# Patient Record
Sex: FEMALE | Race: WHITE | NOT HISPANIC OR LATINO | Employment: OTHER | URBAN - METROPOLITAN AREA
[De-identification: names, ages, dates, MRNs, and addresses within clinical notes are randomized per-mention and may not be internally consistent; named-entity substitution may affect disease eponyms.]

---

## 2017-03-27 ENCOUNTER — OFFICE VISIT (OUTPATIENT)
Dept: ORTHOPEDICS | Facility: CLINIC | Age: 62
End: 2017-03-27
Payer: COMMERCIAL

## 2017-03-27 ENCOUNTER — RADIANT APPOINTMENT (OUTPATIENT)
Dept: GENERAL RADIOLOGY | Facility: CLINIC | Age: 62
End: 2017-03-27
Attending: ORTHOPAEDIC SURGERY
Payer: COMMERCIAL

## 2017-03-27 VITALS — HEIGHT: 65 IN | BODY MASS INDEX: 48.82 KG/M2 | RESPIRATION RATE: 18 BRPM | WEIGHT: 293 LBS

## 2017-03-27 DIAGNOSIS — Z96.651 HISTORY OF TOTAL KNEE ARTHROPLASTY, RIGHT: ICD-10-CM

## 2017-03-27 DIAGNOSIS — M25.561 RIGHT KNEE PAIN, UNSPECIFIED CHRONICITY: Primary | ICD-10-CM

## 2017-03-27 DIAGNOSIS — M25.561 RIGHT KNEE PAIN, UNSPECIFIED CHRONICITY: ICD-10-CM

## 2017-03-27 PROCEDURE — 73562 X-RAY EXAM OF KNEE 3: CPT | Mod: RT

## 2017-03-27 PROCEDURE — 99213 OFFICE O/P EST LOW 20 MIN: CPT | Performed by: ORTHOPAEDIC SURGERY

## 2017-03-27 NOTE — MR AVS SNAPSHOT
"              After Visit Summary   3/27/2017    Violeta Maria    MRN: [id number]           Patient Information     Date Of Birth          1955        Visit Information        Provider Department      3/27/2017 1:15 PM Abdon Barahona MD [facility]        Today's Diagnoses     Right knee pain, unspecified chronicity    -  1      Care Instructions    Clicking her is normal.  Continue strengthening.  Plan Return to clinic  July.        Follow-ups after your visit        Who to contact     If you have questions or need follow up information about today's clinic visit or your schedule please contact [facility] directly at [phone].  Normal or non-critical lab and imaging results will be communicated to you by MyChart, letter or phone within 4 business days after the clinic has received the results. If you do not hear from us within 7 days, please contact the clinic through [employer] or phone. If you have a critical or abnormal lab result, we will notify you by phone as soon as possible.  Submit refill requests through [employer] or call your pharmacy and they will forward the refill request to us. Please allow 3 business days for your refill to be completed.          Additional Information About Your Visit        MyChart Information     [employer] gives you secure access to your electronic health record. If you see a primary care provider, you can also send messages to your care team and make appointments. If you have questions, please call your primary care clinic.  If you do not have a primary care provider, please call [phone] and they will assist you.        Care EveryWhere ID     This is your Care EveryWhere ID. This could be used by other organizations to access your [place] medical records  [id number]        Your Vitals Were     Respirations Height BMI (Body Mass Index)             18 1.651 m (5' 5\") 48.76 kg/m2          Blood Pressure from Last 3 Encounters:   No " data found for BP    Weight from Last 3 Encounters:   03/27/17 132.9 kg (293 lb)   10/01/15 132.9 kg (293 lb)   08/27/15 132 kg (291 lb)               Primary Care Provider Office Phone # Fax #    Abdon Eitan Barahona [phone]       [facility] [address]  [facility] [address]        Thank you!     Thank you for choosing [facility]""  for your care. Our goal is always to provide you with excellent care. Hearing back from our patients is one way we can continue to improve our services. Please take a few minutes to complete the written survey that you may receive in the mail after your visit with us. Thank you!             Your Updated Medication List - Protect others around you: Learn how to safely use, store and throw away your medicines at www.disposemymeds.org.          This list is accurate as of: 3/27/17  1:46 PM.  Always use your most recent med list.                   Brand Name Dispense Instructions for use    ALLEGRA PO      Take  by mouth.       CYMBALTA 20 MG EC capsule   Generic drug:  DULoxetine      Take 20 mg by mouth 2 times daily.       FISH OIL          FLEXERIL PO      Take  by mouth as needed.       FLONASE 50 MCG/ACT spray   Generic drug:  fluticasone      Spray 2 sprays into both nostrils daily.       hydrOXYzine 25 MG capsule    VISTARIL    30 capsule    Take 1 capsule (25 mg) by mouth 3 times daily as needed for itching (spasm)       LUNESTA 1 MG Tabs tablet   Generic drug:  eszopiclone      Take 1 mg by mouth At Bedtime.       LYRICA 100 MG capsule   Generic drug:  pregabalin      Take  by mouth 3 times daily.       MULTI-VITAMIN DAILY PO      Take  by mouth.       oxyCODONE-acetaminophen 5-325 MG per tablet    PERCOCET    60 tablet    Take 1 tablet by mouth every 4 hours as needed for pain       predniSONE 20 MG tablet    DELTASONE     Take 20 mg by mouth 2 times daily       RESTASIS 0.05 % ophthalmic emulsion   Generic drug:   cycloSPORINE      1 drop every 12 hours.       SYNTHROID 100 MCG tablet   Generic drug:  levothyroxine      Take  by mouth daily.       TRAZODONE HCL PO      Take  by mouth.       ULTRAM 50 MG tablet   Generic drug:  traMADol      Take 50 mg by mouth every 6 hours as needed.       VICODIN PO      Take  by mouth as needed.       ZOLOFT PO      Take  by mouth daily.

## 2017-03-27 NOTE — PATIENT INSTRUCTIONS
Clicking her is normal.  Continue strengthening.  Plan Return to clinic 2 years with bilateral knee x-ray.

## 2017-03-27 NOTE — NURSING NOTE
"Chief Complaint   Patient presents with     RECHECK     Right TKA on 7/15/15 is causing her pain.       Initial Resp 18  Ht 1.651 m (5' 5\")  Wt 132.9 kg (293 lb)  BMI 48.76 kg/m2 Estimated body mass index is 48.76 kg/(m^2) as calculated from the following:    Height as of this encounter: 1.651 m (5' 5\").    Weight as of this encounter: 132.9 kg (293 lb).  Medication Reconciliation: complete   Virgie Orozco MA      "

## 2017-03-27 NOTE — LETTER
3/27/2017       RE: Violeta Maria  [address]   [place] [address]           Dear Colleague,    Thank you for referring your patient, Violeta Maria, to the [facility]. Please see a copy of my visit note below.    Follow up right total knee arthroplasty from 7/15/15.  She had left total knee arthroplasty 1/23/13.  She complains of a clicking in her right knee.  She also reports feeling the left knee is weak.  Diagnosis lately with psoriatic arthritis.  Exercise:  Walking.    History reviewed. No pertinent past medical history.    Past Surgical History:   Procedure Laterality Date     C TOTAL KNEE ARTHROPLASTY  1/23/13    Left     C TOTAL KNEE ARTHROPLASTY Right 7/15/15     HC KNEE SCOPE,SINGLE MENISECTOMY  2/17/12    Right, BOTH MEDIAL AND LATERAL MENISECTOMIES       History reviewed. No pertinent family history.    Social History     Social History     Marital status:      Spouse name: N/A     Number of children: N/A     Years of education: N/A     Occupational History     Not on file.     Social History Main Topics     Smoking status: Never Smoker     Smokeless tobacco: Never Used     Alcohol use No     Drug use: No     Sexual activity: Not on file     Other Topics Concern     Not on file     Social History Narrative       Current Outpatient Prescriptions   Medication Sig Dispense Refill     predniSONE (DELTASONE) 20 MG tablet Take 20 mg by mouth 2 times daily       oxyCODONE-acetaminophen (PERCOCET) 5-325 MG per tablet Take 1 tablet by mouth every 4 hours as needed for pain 60 tablet 0     hydrOXYzine (VISTARIL) 25 MG capsule Take 1 capsule (25 mg) by mouth 3 times daily as needed for itching (spasm) 30 capsule 1     fluticasone (FLONASE) 50 MCG/ACT nasal spray Spray 2 sprays into both nostrils daily.       cycloSPORINE (RESTASIS) 0.05 % ophthalmic emulsion 1 drop every 12 hours.       eszopiclone (LUNESTA) 1 MG TABS Take 1 mg by mouth At Bedtime.       TRAZODONE HCL PO  "Take  by mouth.       pregabalin (LYRICA) 100 MG capsule Take  by mouth 3 times daily.       DULoxetine (CYMBALTA) 20 MG capsule Take 20 mg by mouth 2 times daily.       levothyroxine (SYNTHROID) 100 MCG tablet Take  by mouth daily.       Fexofenadine HCl (ALLEGRA PO) Take  by mouth.       Sertraline HCl (ZOLOFT PO) Take  by mouth daily.       Multiple Vitamin (MULTI-VITAMIN DAILY PO) Take  by mouth.       FISH OIL        traMADol (ULTRAM) 50 MG tablet Take 50 mg by mouth every 6 hours as needed.       Hydrocodone-Acetaminophen (VICODIN PO) Take  by mouth as needed.       Cyclobenzaprine HCl (FLEXERIL PO) Take  by mouth as needed.         Allergies   Allergen Reactions     Augmentin      Erythromycin      Neurontin [Gabapentin Enacarbil]      Vioxx        REVIEW OF SYSTEMS:  CONSTITUTIONAL:  NEGATIVE for fever, chills, change in weight, not feeling tired  SKIN:  NEGATIVE for worrisome rashes, no skin lumps, no skin ulcers and no non-healing wounds  EYES:  NEGATIVE for vision changes or irritation.  ENT/MOUTH:  NEGATIVE.  No hearing loss, no hoarseness, no difficulty swallowing.  RESP:  NEGATIVE. No cough or shortness of breath.  BREAST:  NEGATIVE for masses, tenderness or discharge  CV:  NEGATIVE for chest pain, palpitations or peripheral edema  GI:  NEGATIVE for nausea, abdominal pain, heartburn, or change in bowel habits  :  Negative. No dysuria, no hematuria  MUSCULOSKELETAL:  See HPI above  NEURO:  NEGATIVE . No headaches, no dizziness,  no numbness  ENDOCRINE:  NEGATIVE for temperature intolerance, skin/hair changes  HEME/ALLERGY/IMMUNE:  NEGATIVE for bleeding problems  PSYCHIATRIC:  NEGATIVE. no anxiety, no depression.      Xray:  Xray images right total knee arthroplasty were independently visualized with the patient.  This shows good position of components.  No sign of loosening or wear.     Exam:  Vitals: Resp 18  Ht 1.651 m (5' 5\")  Wt 132.9 kg (293 lb)  BMI 48.76 kg/m2  BMI= Body mass index is 48.76 " kg/(m^2).  Range of motion right 0-115 degrees, left 0-115 degrees.  Alignment  Normal.  Stability:   Right       Lateral collateral ligament no laxity       Medial collateral ligament no laxity  Left:       Lateral collateral ligament mild laxity       Medial collateral ligament mild laxity  Wound:  Well healed.  Edema: None  Effusion: None  Tenderness: Right None       Left:  None  Sensation, motor, and circulation intact.    Assessment:  bilateral total knee arthroplasty doing well.  Right feels more stable than left.  Plan:  Resume activity as tolerated.  Return to clinic 2-4 years with xray bilateral knee.  Continue antibiotics for dental work.      Again, thank you for allowing me to participate in the care of your patient.        Sincerely,    Abdon Barahona MD

## 2017-03-28 NOTE — PROGRESS NOTES
Follow up right total knee arthroplasty from 7/15/15.  She had left total knee arthroplasty 1/23/13.  She complains of a clicking in her right knee.  She also reports feeling the left knee is weak.  Diagnosis lately with psoriatic arthritis.  Exercise:  Walking.    History reviewed. No pertinent past medical history.    Past Surgical History:   Procedure Laterality Date     C TOTAL KNEE ARTHROPLASTY  1/23/13    Left     C TOTAL KNEE ARTHROPLASTY Right 7/15/15     HC KNEE SCOPE,SINGLE MENISECTOMY  2/17/12    Right, BOTH MEDIAL AND LATERAL MENISECTOMIES       History reviewed. No pertinent family history.    Social History     Social History     Marital status:      Spouse name: N/A     Number of children: N/A     Years of education: N/A     Occupational History     Not on file.     Social History Main Topics     Smoking status: Never Smoker     Smokeless tobacco: Never Used     Alcohol use No     Drug use: No     Sexual activity: Not on file     Other Topics Concern     Not on file     Social History Narrative       Current Outpatient Prescriptions   Medication Sig Dispense Refill     predniSONE (DELTASONE) 20 MG tablet Take 20 mg by mouth 2 times daily       oxyCODONE-acetaminophen (PERCOCET) 5-325 MG per tablet Take 1 tablet by mouth every 4 hours as needed for pain 60 tablet 0     hydrOXYzine (VISTARIL) 25 MG capsule Take 1 capsule (25 mg) by mouth 3 times daily as needed for itching (spasm) 30 capsule 1     fluticasone (FLONASE) 50 MCG/ACT nasal spray Spray 2 sprays into both nostrils daily.       cycloSPORINE (RESTASIS) 0.05 % ophthalmic emulsion 1 drop every 12 hours.       eszopiclone (LUNESTA) 1 MG TABS Take 1 mg by mouth At Bedtime.       TRAZODONE HCL PO Take  by mouth.       pregabalin (LYRICA) 100 MG capsule Take  by mouth 3 times daily.       DULoxetine (CYMBALTA) 20 MG capsule Take 20 mg by mouth 2 times daily.       levothyroxine (SYNTHROID) 100 MCG tablet Take  by mouth daily.        "Fexofenadine HCl (ALLEGRA PO) Take  by mouth.       Sertraline HCl (ZOLOFT PO) Take  by mouth daily.       Multiple Vitamin (MULTI-VITAMIN DAILY PO) Take  by mouth.       FISH OIL        traMADol (ULTRAM) 50 MG tablet Take 50 mg by mouth every 6 hours as needed.       Hydrocodone-Acetaminophen (VICODIN PO) Take  by mouth as needed.       Cyclobenzaprine HCl (FLEXERIL PO) Take  by mouth as needed.         Allergies   Allergen Reactions     Augmentin      Erythromycin      Neurontin [Gabapentin Enacarbil]      Vioxx        REVIEW OF SYSTEMS:  CONSTITUTIONAL:  NEGATIVE for fever, chills, change in weight, not feeling tired  SKIN:  NEGATIVE for worrisome rashes, no skin lumps, no skin ulcers and no non-healing wounds  EYES:  NEGATIVE for vision changes or irritation.  ENT/MOUTH:  NEGATIVE.  No hearing loss, no hoarseness, no difficulty swallowing.  RESP:  NEGATIVE. No cough or shortness of breath.  BREAST:  NEGATIVE for masses, tenderness or discharge  CV:  NEGATIVE for chest pain, palpitations or peripheral edema  GI:  NEGATIVE for nausea, abdominal pain, heartburn, or change in bowel habits  :  Negative. No dysuria, no hematuria  MUSCULOSKELETAL:  See HPI above  NEURO:  NEGATIVE . No headaches, no dizziness,  no numbness  ENDOCRINE:  NEGATIVE for temperature intolerance, skin/hair changes  HEME/ALLERGY/IMMUNE:  NEGATIVE for bleeding problems  PSYCHIATRIC:  NEGATIVE. no anxiety, no depression.      Xray:  Xray images right total knee arthroplasty were independently visualized with the patient.  This shows good position of components.  No sign of loosening or wear.     Exam:  Vitals: Resp 18  Ht 1.651 m (5' 5\")  Wt 132.9 kg (293 lb)  BMI 48.76 kg/m2  BMI= Body mass index is 48.76 kg/(m^2).  Range of motion right 0-115 degrees, left 0-115 degrees.  Alignment  Normal.  Stability:   Right       Lateral collateral ligament no laxity       Medial collateral ligament no laxity  Left:       Lateral collateral ligament " mild laxity       Medial collateral ligament mild laxity  Wound:  Well healed.  Edema: None  Effusion: None  Tenderness: Right None       Left:  None  Sensation, motor, and circulation intact.    Assessment:  bilateral total knee arthroplasty doing well.  Right feels more stable than left.  Plan:  Resume activity as tolerated.  Return to clinic 2-4 years with xray bilateral knee.  Continue antibiotics for dental work.

## 2017-05-24 ENCOUNTER — OFFICE VISIT (OUTPATIENT)
Dept: ORTHOPEDICS | Facility: CLINIC | Age: 62
End: 2017-05-24
Payer: COMMERCIAL

## 2017-05-24 ENCOUNTER — RADIANT APPOINTMENT (OUTPATIENT)
Dept: GENERAL RADIOLOGY | Facility: CLINIC | Age: 62
End: 2017-05-24
Attending: ORTHOPAEDIC SURGERY
Payer: COMMERCIAL

## 2017-05-24 VITALS
HEART RATE: 77 BPM | SYSTOLIC BLOOD PRESSURE: 140 MMHG | OXYGEN SATURATION: 94 % | BODY MASS INDEX: 44.93 KG/M2 | WEIGHT: 270 LBS | DIASTOLIC BLOOD PRESSURE: 74 MMHG

## 2017-05-24 DIAGNOSIS — M25.511 ACUTE PAIN OF RIGHT SHOULDER: ICD-10-CM

## 2017-05-24 DIAGNOSIS — S46.011A ROTATOR CUFF STRAIN, RIGHT, INITIAL ENCOUNTER: Primary | ICD-10-CM

## 2017-05-24 DIAGNOSIS — M75.31 CALCIFIC TENDINITIS OF RIGHT SHOULDER: ICD-10-CM

## 2017-05-24 DIAGNOSIS — M25.519 SHOULDER PAIN: ICD-10-CM

## 2017-05-24 PROCEDURE — 73030 X-RAY EXAM OF SHOULDER: CPT | Mod: RT

## 2017-05-24 PROCEDURE — 20610 DRAIN/INJ JOINT/BURSA W/O US: CPT | Mod: RT | Performed by: ORTHOPAEDIC SURGERY

## 2017-05-24 PROCEDURE — 99204 OFFICE O/P NEW MOD 45 MIN: CPT | Mod: 25 | Performed by: ORTHOPAEDIC SURGERY

## 2017-05-24 RX ORDER — OXYCODONE AND ACETAMINOPHEN 5; 325 MG/1; MG/1
1-2 TABLET ORAL EVERY 6 HOURS PRN
Qty: 30 TABLET | Refills: 0 | Status: ON HOLD | OUTPATIENT
Start: 2017-05-24 | End: 2018-10-18

## 2017-05-24 ASSESSMENT — PAIN SCALES - GENERAL: PAINLEVEL: SEVERE PAIN (7)

## 2017-05-24 NOTE — MR AVS SNAPSHOT
After Visit Summary   5/24/2017    Violeta Maria    MRN: [id number]           Patient Information     Date Of Birth          1955        Visit Information        Provider Department      5/24/2017 2:45 PM Rodolfo Robles MD [facility]        Today's Diagnoses     Rotator cuff strain, right, initial encounter    -  1    Acute pain of right shoulder        Calcific tendinitis of right shoulder          Care Instructions    Please remember to call and schedule a follow up appointment if one was recommended at your earliest convenience.   Orthopedics CLINIC HOURS TELEPHONE NUMBER   Rodolfo Robles M.D.      Carina Buster,  LPN Tuesday 8 am -5 pm    1st & 3rd Wednesday  1-4pm [place] 2nd & 4th Wednesday  8 am -11 pm / [place]  1-4pm / [place] Thursday 8 am -5 pm   Specialty schedulers:   [phone] to make an appointment with any Specialty Provider.   Main Clinic:   [phone] to make an appointment with your primary provider   Urgent Care locations:    [facility] Monday-Friday 5 pm - 9 pm  Saturday-Sunday 9 am -5pm      Monday-Friday 11 am - 9 pm  Saturday 9 am - 5 pm [phone]     If SURGERY has been recommended, please call our Specialty Schedulers at [phone] to schedule your procedure.    If you need a medication refill, please contact your pharmacy. Please allow 3 business days for your refill to be completed.  Use [employer] (secure e-mail communication and access to your chart) to send a message or to make an appointment. Please ask how you can sign up for [employer].            Follow-ups after your visit        Who to contact     If you have questions or need follow up information about today's clinic visit or your schedule please contact [facility] directly at [phone].  Normal or non-critical lab and imaging results will be communicated to you by MyChart, letter or phone within 4 business  days after the clinic has received the results. If you do not hear from us within 7 days, please contact the clinic through [employer] or phone. If you have a critical or abnormal lab result, we will notify you by phone as soon as possible.  Submit refill requests through [employer] or call your pharmacy and they will forward the refill request to us. Please allow 3 business days for your refill to be completed.          Additional Information About Your Visit        [employer] Information     [employer] gives you secure access to your electronic health record. If you see a primary care provider, you can also send messages to your care team and make appointments. If you have questions, please call your primary care clinic.  If you do not have a primary care provider, please call [phone] and they will assist you.        Care EveryWhere ID     This is your Care EveryWhere ID. This could be used by other organizations to access your [place] medical records  [id number]        Your Vitals Were     Pulse Pulse Oximetry BMI (Body Mass Index)             77 94% 44.93 kg/m2          Blood Pressure from Last 3 Encounters:   05/24/17 140/74    Weight from Last 3 Encounters:   05/24/17 122.5 kg (270 lb)   03/27/17 132.9 kg (293 lb)   10/01/15 132.9 kg (293 lb)              We Performed the Following     DRAIN/INJECT LARGE JOINT/BURSA     METHYLPREDNISOLONE 80 MG INJ          Today's Medication Changes          These changes are accurate as of: 5/24/17 11:59 PM.  If you have any questions, ask your nurse or doctor.               These medicines have changed or have updated prescriptions.        Dose/Directions    * oxyCODONE-acetaminophen 5-325 MG per tablet   Commonly known as:  PERCOCET   This may have changed:  Another medication with the same name was added. Make sure you understand how and when to take each.   Used for:  History of total knee arthroplasty, right   Changed by:  Abdon Barahona MD        Dose:  1 tablet    Take 1 tablet by mouth every 4 hours as needed for pain   Quantity:  60 tablet   Refills:  0       * oxyCODONE-acetaminophen 5-325 MG per tablet   Commonly known as:  PERCOCET   This may have changed:  You were already taking a medication with the same name, and this prescription was added. Make sure you understand how and when to take each.   Used for:  Rotator cuff strain, right, initial encounter   Changed by:  Rodolfo Robles MD        Dose:  1-2 tablet   Take 1-2 tablets by mouth every 6 hours as needed for pain   Quantity:  30 tablet   Refills:  0       * Notice:  This list has 2 medication(s) that are the same as other medications prescribed for you. Read the directions carefully, and ask your doctor or other care provider to review them with you.         Where to get your medicines      Some of these will need a paper prescription and others can be bought over the counter.  Ask your nurse if you have questions.     Bring a paper prescription for each of these medications     oxyCODONE-acetaminophen 5-325 MG per tablet                Primary Care Provider Office Phone # Fax #    Abdon Barahona [phone]       [address] [address]        Thank you!     Thank you for choosing [facility]  for your care. Our goal is always to provide you with excellent care. Hearing back from our patients is one way we can continue to improve our services. Please take a few minutes to complete the written survey that you may receive in the mail after your visit with us. Thank you!             Your Updated Medication List - Protect others around you: Learn how to safely use, store and throw away your medicines at www.disposemymeds.org.          This list is accurate as of: 5/24/17 11:59 PM.  Always use your most recent med list.                   Brand Name Dispense Instructions for use    ALLEGRA PO      Take  by mouth.        CYMBALTA 20 MG EC capsule   Generic drug:  DULoxetine      Take 20 mg by mouth 2 times daily.       FISH OIL          FLEXERIL PO      Take  by mouth as needed.       FLONASE 50 MCG/ACT spray   Generic drug:  fluticasone      Spray 2 sprays into both nostrils daily.       hydrOXYzine 25 MG capsule    VISTARIL    30 capsule    Take 1 capsule (25 mg) by mouth 3 times daily as needed for itching (spasm)       LUNESTA 1 MG Tabs tablet   Generic drug:  eszopiclone      Take 1 mg by mouth At Bedtime.       LYRICA 100 MG capsule   Generic drug:  pregabalin      Take  by mouth 3 times daily.       MULTI-VITAMIN DAILY PO      Take  by mouth.       * oxyCODONE-acetaminophen 5-325 MG per tablet    PERCOCET    60 tablet    Take 1 tablet by mouth every 4 hours as needed for pain       * oxyCODONE-acetaminophen 5-325 MG per tablet    PERCOCET    30 tablet    Take 1-2 tablets by mouth every 6 hours as needed for pain       predniSONE 20 MG tablet    DELTASONE     Take 20 mg by mouth 2 times daily       RESTASIS 0.05 % ophthalmic emulsion   Generic drug:  cycloSPORINE      1 drop every 12 hours.       SYNTHROID 100 MCG tablet   Generic drug:  levothyroxine      Take  by mouth daily.       TRAZODONE HCL PO      Take  by mouth.       ULTRAM 50 MG tablet   Generic drug:  traMADol      Take 50 mg by mouth every 6 hours as needed.       VICODIN PO      Take  by mouth as needed.       ZOLOFT PO      Take  by mouth daily.       * Notice:  This list has 2 medication(s) that are the same as other medications prescribed for you. Read the directions carefully, and ask your doctor or other care provider to review them with you.

## 2017-05-24 NOTE — PROGRESS NOTES
"SUBJECTIVE:  Violeta Maria is a 61 year old female who is seen as self referral for right shoulder pain. She was walking on a treadmill on too high of setting and when she went to catch herself. She did not feel pain right away.   Symptoms have been worsening starting 2 days following the incident.   Present symptoms: pain up and reaching away from body, pain lifting, weakness lifting.   No numbness or tingling.    Pain location: posterior, anterior, lateral; radiates down to the elbow    Treatment up to this point: PT (by Dr. Leone--at [facility]), Aleve, ice, Tylenol are not helping.   She is currently taking Percocet.   Prior history of related problems: no prior problems with this area in the past    Patient\"s past medical, surgical, social and family histories reviewed.     Significant Orthopedic past medical history: Left TOTAL KNEE ARTHROPLASTY 1/23/13, Right TOTAL KNEE ARTHROPLASTY 7/15/15, Right KNEE ARTHROSCOPY 2/17/12. History of neck problems in the past.     PAST MEDICAL HISTORY: No past medical history on file.   Patient Active Problem List   Diagnosis     MEDIAL MENISCUS TEAR - right     TEAR OF LATERAL MENISCUS OF KNEE JOINT - Right     S/P arthroscopy of right knee     Arthritis of knee - bilateral     Cellulitis of knee, left     History of total knee arthroplasty - bilateral     Sprain of MCL (medial collateral ligament) of knee     Left sided sciatica     Cellulitis of knee     Primary osteoarthritis of right knee   Chronic pain issues.    PAST SURGICAL HISTORY:   Past Surgical History:   Procedure Laterality Date     C TOTAL KNEE ARTHROPLASTY Left 01/23/2013    DML     C TOTAL KNEE ARTHROPLASTY Right 07/15/2015    DML     HC KNEE SCOPE,SINGLE MENISECTOMY  2/17/12    Right, BOTH MEDIAL AND LATERAL MENISECTOMIES     FAMILY HISTORY: No family history on file.    SOCIAL HISTORY:   Social History   Substance Use Topics     Smoking status: Never Smoker     Smokeless tobacco: Never Used     Alcohol use " No     REVIEW OF SYSTEMS:  CONSTITUTIONAL:  NEGATIVE for fever, chills, change in weight  INTEGUMENTARY/SKIN:  NEGATIVE for worrisome rashes, moles or lesions  EYES:  NEGATIVE for vision changes or irritation  ENT/MOUTH:  NEGATIVE for ear, mouth and throat problems  RESP:  NEGATIVE for significant cough or SOB  BREAST:  NEGATIVE for masses, tenderness or discharge  CV:  NEGATIVE for chest pain, palpitations or peripheral edema  GI:  NEGATIVE for nausea, abdominal pain, heartburn, or change in bowel habits  :  Negative   MUSCULOSKELETAL:  See HPI above  NEURO:  NEGATIVE for weakness, dizziness or paresthesias  ENDOCRINE:  NEGATIVE for temperature intolerance, skin/hair changes  HEME/ALLERGY/IMMUNE:  NEGATIVE for bleeding problems  PSYCHIATRIC:  NEGATIVE for changes in mood or affect      EXAM:  /74 (BP Location: Left arm, Patient Position: Chair, Cuff Size: Adult Large)  Pulse 77  Wt 122.5 kg (270 lb)  SpO2 94%  BMI 44.93 kg/m2  GENERAL: healthy, alert and no distress  GAIT: normal   SKIN: no suspicious lesions or rashes  NEURO: Normal strength and tone, mentation intact and speech normal  VASCULAR: normal pulses and cappillary refill   PSYCH:  mentation appears normal and affect normal/bright    NECK:  Cervical range of motion: full, painfree, and does not cause shoulder pain or reproduce shoulder pain.    SHOULDER:  Shoulder Inspection: no swelling, bruising, discoloration. Slight atrophy of girdle muscles on the right side. No biceps deformity.   Tender: mostly over greater tuberosity, diffusely (minor)  Range of Motion:   Active:forward flexion 90 degrees she needed help to lower it, internal rotation T12   Passive: forward flexion 130* degrees with crepitations, external rotation full  Strength: forward flexion 5-/5 painful, External rotation 5/5  Liftoff: Able  Sensation: intact    X-rays: Obtained today of the RIGHT SHOULDER: 3-views, reviewed in the office with the patient by myself today and show  the humeral head is not elevated. There are some calcifications in the rotator cuff presumably the infraspinatus. It is difficult to tell the acromion morphology. No significant AC joint arthritic changes.     Impression:  1. Rotator cuff strain  2. Possible small rotator cuff tear   3. Calcific rotator cuff tendinitis     Plan:   I discussed the findings and diagnosis with the patient. We talked about the treatment options: non-invasive treatments and corticosteroid injections. All questions were answered. She will be leaving for [place] on 5/30/17 and thus would like to proceed with a corticosteroid injection. If no improvements in 6 weeks, consider MRI of right shoulder.   Continue exercises learned in physical therapy as tolerated.     Meds: She should discuss further pain medications with her PCP. She has chronic pain and is on a pain contract with [facility].  Prescription: Percocet ordered #[id number].     Informed consent was obtained. Right shoulder injected into the subacromial space after sterile prep, using 80mg Depomedrol and 4cc local anesthetic.    Return to the clinic PRN.     PRANAV Robles MD  Dept. Orthopedic Surgery  [facility]    This document serves as a record of the services and decisions personally performed and made by Dr. PRANAV Robles MD. It was created on his behalf by Mitchell Mcdermott, a trained medical scribe. The creation of this record is based on the provider's personal observations and the statements of the patient. This document has been checked and approved by the attending provider.   Mitchell Mcdermott May 24, 2017 3:39 PM

## 2017-05-24 NOTE — PATIENT INSTRUCTIONS
Please remember to call and schedule a follow up appointment if one was recommended at your earliest convenience.   Orthopedics CLINIC HOURS TELEPHONE NUMBER   Rodolfo Robles M.D.      Carina Goins,  LPN Tuesday 8 am -5 pm    1st & 3rd Wednesday  1-4pm [place] 2nd & 4th Wednesday  8 am -11 pm / [place]  1-4pm / [place] Thursday 8 am -5 pm   Specialty schedulers:   [phone] to make an appointment with any Specialty Provider.   Main Clinic:   [phone] to make an appointment with your primary provider   Urgent Care locations:    [facility] Monday-Friday 5 pm - 9 pm  Saturday-Sunday 9 am -5pm      Monday-Friday 11 am - 9 pm  Saturday 9 am - 5 pm [phone]     If SURGERY has been recommended, please call our Specialty Schedulers at [phone] to schedule your procedure.    If you need a medication refill, please contact your pharmacy. Please allow 3 business days for your refill to be completed.  Use [employer] (secure e-mail communication and access to your chart) to send a message or to make an appointment. Please ask how you can sign up for [employer].

## 2017-05-24 NOTE — LETTER
"  5/24/2017       RE: Violeta Maria  [address] [facility] [address]           Dear Colleague,    Thank you for referring your patient, Violeta Maria, to the [facility]. Please see a copy of my visit note below.    SUBJECTIVE:  Violeta Maria is a 61 year old female who is seen as self referral for right shoulder pain. She was walking on a treadmill on too high of setting and when she went to catch herself. She did not feel pain right away.   Symptoms have been worsening starting 2 days following the incident.   Present symptoms: pain up and reaching away from body, pain lifting, weakness lifting.   No numbness or tingling.    Pain location: posterior, anterior, lateral; radiates down to the elbow    Treatment up to this point: PT (by Dr. Leone--at [facility]), Aleve, ice, Tylenol are not helping.   She is currently taking Percocet.   Prior history of related problems: no prior problems with this area in the past    Patient\"s past medical, surgical, social and family histories reviewed.     Significant Orthopedic past medical history: Left TOTAL KNEE ARTHROPLASTY 1/23/13, Right TOTAL KNEE ARTHROPLASTY 7/15/15, Right KNEE ARTHROSCOPY 2/17/12. History of neck problems in the past.     PAST MEDICAL HISTORY: No past medical history on file.   Patient Active Problem List   Diagnosis     MEDIAL MENISCUS TEAR - right     TEAR OF LATERAL MENISCUS OF KNEE JOINT - Right     S/P arthroscopy of right knee     Arthritis of knee - bilateral     Cellulitis of knee, left     History of total knee arthroplasty - bilateral     Sprain of MCL (medial collateral ligament) of knee     Left sided sciatica     Cellulitis of knee     Primary osteoarthritis of right knee   Chronic pain issues.    PAST SURGICAL HISTORY:   Past Surgical History:   Procedure Laterality Date     C TOTAL KNEE ARTHROPLASTY Left 01/23/2013    DML     C TOTAL KNEE ARTHROPLASTY Right 07/15/2015    DML     HC KNEE SCOPE,SINGLE " MENISECTOMY  2/17/12    Right, BOTH MEDIAL AND LATERAL MENISECTOMIES     FAMILY HISTORY: No family history on file.    SOCIAL HISTORY:   Social History   Substance Use Topics     Smoking status: Never Smoker     Smokeless tobacco: Never Used     Alcohol use No     REVIEW OF SYSTEMS:  CONSTITUTIONAL:  NEGATIVE for fever, chills, change in weight  INTEGUMENTARY/SKIN:  NEGATIVE for worrisome rashes, moles or lesions  EYES:  NEGATIVE for vision changes or irritation  ENT/MOUTH:  NEGATIVE for ear, mouth and throat problems  RESP:  NEGATIVE for significant cough or SOB  BREAST:  NEGATIVE for masses, tenderness or discharge  CV:  NEGATIVE for chest pain, palpitations or peripheral edema  GI:  NEGATIVE for nausea, abdominal pain, heartburn, or change in bowel habits  :  Negative   MUSCULOSKELETAL:  See HPI above  NEURO:  NEGATIVE for weakness, dizziness or paresthesias  ENDOCRINE:  NEGATIVE for temperature intolerance, skin/hair changes  HEME/ALLERGY/IMMUNE:  NEGATIVE for bleeding problems  PSYCHIATRIC:  NEGATIVE for changes in mood or affect      EXAM:  /74 (BP Location: Left arm, Patient Position: Chair, Cuff Size: Adult Large)  Pulse 77  Wt 122.5 kg (270 lb)  SpO2 94%  BMI 44.93 kg/m2  GENERAL: healthy, alert and no distress  GAIT: normal   SKIN: no suspicious lesions or rashes  NEURO: Normal strength and tone, mentation intact and speech normal  VASCULAR: normal pulses and cappillary refill   PSYCH:  mentation appears normal and affect normal/bright    NECK:  Cervical range of motion: full, painfree, and does not cause shoulder pain or reproduce shoulder pain.    SHOULDER:  Shoulder Inspection: no swelling, bruising, discoloration. Slight atrophy of girdle muscles on the right side. No biceps deformity.   Tender: mostly over greater tuberosity, diffusely (minor)  Range of Motion:   Active:forward flexion 90 degrees she needed help to lower it, internal rotation T12   Passive: forward flexion 130* degrees  with crepitations, external rotation full  Strength: forward flexion 5-/5 painful, External rotation 5/5  Liftoff: Able  Sensation: intact    X-rays: Obtained today of the RIGHT SHOULDER: 3-views, reviewed in the office with the patient by myself today and show the humeral head is not elevated. There are some calcifications in the rotator cuff presumably the infraspinatus. It is difficult to tell the acromion morphology. No significant AC joint arthritic changes.     Impression:  1. Rotator cuff strain  2. Possible small rotator cuff tear   3. Calcific rotator cuff tendinitis     Plan:   I discussed the findings and diagnosis with the patient. We talked about the treatment options: non-invasive treatments and corticosteroid injections. All questions were answered. She will be leaving for [place] on 5/30/17 and thus would like to proceed with a corticosteroid injection. If no improvements in 6 weeks, consider MRI of right shoulder.   Continue exercises learned in physical therapy as tolerated.     Meds: She should discuss further pain medications with her PCP. She has chronic pain and is on a pain contract with [facility].  Prescription: Percocet ordered #20.     Informed consent was obtained. Right shoulder injected into the subacromial space after sterile prep, using 80mg Depomedrol and 4cc local anesthetic.    Return to the clinic PRN.     PRANAV Robles MD  Dept. Orthopedic Surgery  [facility]    This document serves as a record of the services and decisions personally performed and made by Dr. PRANAV Robles MD. It was created on his behalf by Mitchell Mcdermott, a trained medical scribe. The creation of this record is based on the provider's personal observations and the statements of the patient. This document has been checked and approved by the attending provider.   Mitchell Mcdermott May 24, 2017 3:39 PM    Again, thank you for allowing me to participate in the care of your patient.         Sincerely,              Rodolfo Robles MD

## 2017-05-24 NOTE — NURSING NOTE
"A steroid and or Hylan G - F 20 injection was performed on 5/24/2017 at 3:43 PM. Risks,benefits and complications of the injection were discussed with the patient and the patient has elected to proceed after verbal consent. Using sterile technique, the area was prepped with betadine . A 22 Gauge 1.5\" was used to inject the medication(s) listed below.This was well tolerated. No apparent complications. . Did also discuss that if diabetic, recommend close monitoring of blood sugars over the next week as cortisone injections can temporarily elevate blood sugar.    The following medication(s) was given:     MEDICATION: Depo Medrol 80mg per mL  ROUTE: subacromial  SITE:Right Shoulder   : Udemy (Depo-Medrol)  DOSE: 1 ml  LOT #: [id number]  EXPIRATION DATE:  8/2019    MEDICATION: Lidocaine HCL  1% per mL  : Hospira (Marcaine,Lidoncaine)  DOSE: 4 ml  LOT #: [id number]  EXPIRATION DATE:  1 jan 2019    Rai PIMENTEL    "

## 2017-07-11 ENCOUNTER — OFFICE VISIT (OUTPATIENT)
Dept: ORTHOPEDICS | Facility: CLINIC | Age: 62
End: 2017-07-11
Payer: COMMERCIAL

## 2017-07-11 VITALS — HEIGHT: 65 IN | WEIGHT: 250.6 LBS | BODY MASS INDEX: 41.75 KG/M2

## 2017-07-11 DIAGNOSIS — M75.31 CALCIFIC TENDINITIS OF RIGHT SHOULDER: Primary | ICD-10-CM

## 2017-07-11 DIAGNOSIS — M75.41 IMPINGEMENT SYNDROME OF RIGHT SHOULDER: ICD-10-CM

## 2017-07-11 PROCEDURE — 20610 DRAIN/INJ JOINT/BURSA W/O US: CPT | Mod: RT | Performed by: ORTHOPAEDIC SURGERY

## 2017-07-11 PROCEDURE — 99213 OFFICE O/P EST LOW 20 MIN: CPT | Mod: 25 | Performed by: ORTHOPAEDIC SURGERY

## 2017-07-11 ASSESSMENT — PAIN SCALES - GENERAL: PAINLEVEL: SEVERE PAIN (6)

## 2017-07-11 NOTE — PROGRESS NOTES
"SUBJECTIVE:  Violeta Maria is a 61 year old female who was seen for right shoulder pain on 5/24/17 and diagnosed with rotator cuff strain, possible small rotator cuff tear, and calcific rotator cuff tendinitis.     Present symptoms: difficulty lifting the arm.     Last injection(s): Right subacromial steroid injection on 5/24/17 which helped and took away all of the pain but only lasted about 5 weeks.     Significant Orthopedic past medical history: Left TOTAL KNEE ARTHROPLASTY 1/23/13, Right TOTAL KNEE ARTHROPLASTY 7/15/15, Right KNEE ARTHROSCOPY 2/17/12. History of neck problems in the past.     EXAM:  Ht 1.663 m (5' 5.47\")  Wt 113.7 kg (250 lb 9.6 oz)  BMI 41.1 kg/m2  GENERAL: healthy, alert and no distress  GAIT: normal   SKIN: no suspicious lesions or rashes  NEURO: Normal strength and tone, mentation intact and speech normal  VASCULAR: normal pulses and cappillary refill   PSYCH:  mentation appears normal and affect normal/bright    SHOULDER:  Range of Motion:   Passive: good  Strength: forward flexion 5-/5, External rotation 5/5     Impression:  1. Possible small rotator cuff tear but her strength is pretty good today so this is unlikely  2. Calcific rotator cuff tendinitis     Plan:   I discussed the findings and diagnosis with the patient. We talked about the treatment options: non-invasive treatments and corticosteroid injections. All questions were answered. We elected to proceed with repeat right subacromial steroid injection and physical therapy.   Physical therapy ordered.     Meds: She should discuss further pain medications with her PCP or [facility]. She has chronic pain and is on a pain contract with [facility].    Informed consent was obtained. Right shoulder injected into the subacromial space after sterile prep, using 80mg Depomedrol and 4cc local anesthetic.    Return to the clinic PRN.     PRANAV Robles MD  Dept. Orthopedic Surgery  [facility]    This document serves as a record of " the services and decisions personally performed and made by Dr. PRANAV Robles MD. It was created on his behalf by Mitchell Mcdermott, a trained medical scribe. The creation of this record is based on the provider's personal observations and the statements of the patient. This document has been checked and approved by the attending provider.   Mitchell Mcdermott July 11, 2017 2:22 PM

## 2017-07-11 NOTE — NURSING NOTE
"A steroid and or Hylan G - F 20 injection was performed on 7/11/2017 at 2:26 PM. Risks,benefits and complications of the injection were discussed with the patient and the patient has elected to proceed after verbal consent. Using sterile technique, the area was prepped with betadine . A 22 Gauge 1.5\" was used to inject the medication(s) listed below.This was well tolerated. No apparent complications. . Did also discuss that if diabetic, recommend close monitoring of blood sugars over the next week as cortisone injections can temporarily elevate blood sugar.    The following medication(s) was given:     MEDICATION: Depo Medrol 80mg per mL  ROUTE: subacromial  SITE:Right Shoulder   : Murfie (Depo-Medrol)  DOSE: 1 ml  LOT #: [id number]  EXPIRATION DATE:  8/2019    MEDICATION: Lidocaine HCL  1% per mL  : Hospira (Marcaine,Lidoncaine)  DOSE: 4 ml  LOT #: [id number]  EXPIRATION DATE:  1 sep 2018    Rai PIMENTEL    "

## 2017-07-11 NOTE — MR AVS SNAPSHOT
"              After Visit Summary   7/11/2017    Violeta Maria    MRN: [id number]           Patient Information     Date Of Birth          1955        Visit Information        Provider Department      7/11/2017 2:00 PM Rodolfo Robles MD [facility]         Follow-ups after your visit        Who to contact     If you have questions or need follow up information about today's clinic visit or your schedule please contact [facility] directly at [phone].  Normal or non-critical lab and imaging results will be communicated to you by MyChart, letter or phone within 4 business days after the clinic has received the results. If you do not hear from us within 7 days, please contact the clinic through [employer] or phone. If you have a critical or abnormal lab result, we will notify you by phone as soon as possible.  Submit refill requests through [employer] or call your pharmacy and they will forward the refill request to us. Please allow 3 business days for your refill to be completed.          Additional Information About Your Visit        MyChart Information     [employer] gives you secure access to your electronic health record. If you see a primary care provider, you can also send messages to your care team and make appointments. If you have questions, please call your primary care clinic.  If you do not have a primary care provider, please call [phone] and they will assist you.        Care EveryWhere ID     This is your Care EveryWhere ID. This could be used by other organizations to access your [place] medical records  [id number]        Your Vitals Were     Height BMI (Body Mass Index)                1.663 m (5' 5.47\") 41.1 kg/m2           Blood Pressure from Last 3 Encounters:   05/24/17 140/74    Weight from Last 3 Encounters:   07/11/17 113.7 kg (250 lb 9.6 oz)   05/24/17 122.5 kg (270 lb)   03/27/17 132.9 kg (293 lb)              Today, you had the following     No " orders found for display       Primary Care Provider Office Phone # Fax #    Abdon Barahona [phone]       [address] [address]        Equal Access to Services     BRYAN PAZ : Hadii cheri bourgeois andreeao Socaliali, waaxda luqadaha, qaybta kaalmada adeegyada, waxmaricarmen idiin hayaan gabriela miner laever tomlin. So [facility] [phone].    ATENCIÓN: Si habla español, tiene a ewing disposición servicios gratuitos de asistencia lingüística. Llame al [phone].    We comply with applicable federal civil rights laws and [place] laws. We do not discriminate on the basis of race, color, national origin, age, disability sex, sexual orientation or gender identity.            Thank you!     Thank you for choosing [facility]  for your care. Our goal is always to provide you with excellent care. Hearing back from our patients is one way we can continue to improve our services. Please take a few minutes to complete the written survey that you may receive in the mail after your visit with us. Thank you!             Your Updated Medication List - Protect others around you: Learn how to safely use, store and throw away your medicines at www.disposemymeds.org.          This list is accurate as of: 7/11/17  2:23 PM.  Always use your most recent med list.                   Brand Name Dispense Instructions for use Diagnosis    ALLEGRA PO      Take  by mouth.        CYMBALTA 20 MG EC capsule   Generic drug:  DULoxetine      Take 20 mg by mouth 2 times daily.        FISH OIL           FLEXERIL PO      Take  by mouth as needed.        FLONASE 50 MCG/ACT spray   Generic drug:  fluticasone      Spray 2 sprays into both nostrils daily.        hydrOXYzine 25 MG capsule    VISTARIL    30 capsule    Take 1 capsule (25 mg) by mouth 3 times daily as needed for itching (spasm)    History of total knee replacement, right       LUNESTA 1 MG Tabs tablet   Generic drug:   eszopiclone      Take 1 mg by mouth At Bedtime.        LYRICA 100 MG capsule   Generic drug:  pregabalin      Take  by mouth 3 times daily.        MULTI-VITAMIN DAILY PO      Take  by mouth.        * oxyCODONE-acetaminophen 5-325 MG per tablet    PERCOCET    60 tablet    Take 1 tablet by mouth every 4 hours as needed for pain    History of total knee arthroplasty, right       * oxyCODONE-acetaminophen 5-325 MG per tablet    PERCOCET    30 tablet    Take 1-2 tablets by mouth every 6 hours as needed for pain    Rotator cuff strain, right, initial encounter       predniSONE 20 MG tablet    DELTASONE     Take 20 mg by mouth 2 times daily        RESTASIS 0.05 % ophthalmic emulsion   Generic drug:  cycloSPORINE      1 drop every 12 hours.        SYNTHROID 100 MCG tablet   Generic drug:  levothyroxine      Take  by mouth daily.        TRAZODONE HCL PO      Take  by mouth.        ULTRAM 50 MG tablet   Generic drug:  traMADol      Take 50 mg by mouth every 6 hours as needed.        VICODIN PO      Take  by mouth as needed.        ZOLOFT PO      Take  by mouth daily.        * Notice:  This list has 2 medication(s) that are the same as other medications prescribed for you. Read the directions carefully, and ask your doctor or other care provider to review them with you.

## 2017-07-11 NOTE — LETTER
"      7/11/2017         RE: Violeta Maria  [address] [facility] [address]        Dear Colleague,    Thank you for referring your patient, Violeta Maria, to the [facility]. Please see a copy of my visit note below.    SUBJECTIVE:  Violeta Maria is a 61 year old female who was seen for right shoulder pain on 5/24/17 and diagnosed with rotator cuff strain, possible small rotator cuff tear, and calcific rotator cuff tendinitis.     Present symptoms: difficulty lifting the arm.     Last injection(s): Right subacromial steroid injection on 5/24/17 which helped and took away all of the pain but only lasted about 5 weeks.     Significant Orthopedic past medical history: Left TOTAL KNEE ARTHROPLASTY 1/23/13, Right TOTAL KNEE ARTHROPLASTY 7/15/15, Right KNEE ARTHROSCOPY 2/17/12. History of neck problems in the past.     EXAM:  Ht 1.663 m (5' 5.47\")  Wt 113.7 kg (250 lb 9.6 oz)  BMI 41.1 kg/m2  GENERAL: healthy, alert and no distress  GAIT: normal   SKIN: no suspicious lesions or rashes  NEURO: Normal strength and tone, mentation intact and speech normal  VASCULAR: normal pulses and cappillary refill   PSYCH:  mentation appears normal and affect normal/bright    SHOULDER:  Range of Motion:   Passive: good  Strength: forward flexion 5-/5, External rotation 5/5     Impression:  1. Possible small rotator cuff tear but her strength is pretty good today so this is unlikely  2. Calcific rotator cuff tendinitis     Plan:   I discussed the findings and diagnosis with the patient. We talked about the treatment options: non-invasive treatments and corticosteroid injections. All questions were answered. We elected to proceed with repeat right subacromial steroid injection and physical therapy.   Physical therapy ordered.     Meds: She should discuss further pain medications with her PCP or [facility]. She has chronic pain and is on a pain contract with [facility].    Informed consent was obtained. Right " shoulder injected into the subacromial space after sterile prep, using 80mg Depomedrol and 4cc local anesthetic.    Return to the clinic PRN.     PRANAV Robles MD  Dept. Orthopedic Surgery  [facility]    This document serves as a record of the services and decisions personally performed and made by Dr. PRANAV Robles MD. It was created on his behalf by Mitchell Mcdermott, a trained medical scribe. The creation of this record is based on the provider's personal observations and the statements of the patient. This document has been checked and approved by the attending provider.   Mitchell Mcdermott July 11, 2017 2:22 PM    Again, thank you for allowing me to participate in the care of your patient.        Sincerely,        Rodolfo Robles MD

## 2017-07-27 ENCOUNTER — THERAPY VISIT (OUTPATIENT)
Dept: PHYSICAL THERAPY | Facility: CLINIC | Age: 62
End: 2017-07-27
Payer: COMMERCIAL

## 2017-07-27 DIAGNOSIS — M25.511 ACUTE PAIN OF RIGHT SHOULDER: Primary | ICD-10-CM

## 2017-07-27 PROCEDURE — 97110 THERAPEUTIC EXERCISES: CPT | Mod: GP | Performed by: PHYSICAL THERAPIST

## 2017-07-27 PROCEDURE — 97162 PT EVAL MOD COMPLEX 30 MIN: CPT | Mod: GP | Performed by: PHYSICAL THERAPIST

## 2017-07-27 NOTE — PROGRESS NOTES
"[facility] Initial Evaluation      Subjective:    Patient is a 62 year old female presenting with rehab right shoulder hpi. The history is provided by the patient. No  was used.   Violeta Maria is a 62 year old female with a right shoulder condition.  Condition occurred with:  Other.  Condition occurred: in the community.  This is a new condition  Pt states 05/20/2017 grabbed treadmill with R arm as she felt unsteady on it.  Knew right away didn't feel good.  First injection in late May quite helpful, more recent injection not helpful.  Referred to PT 07/11/2017.    Patient reports pain:  Anterior, posterior and in the joint.    Pain is described as aching and is intermittent and reported as 3/10.   Pain is worse in the P.M..  Exacerbated by: reaching, getting in / out of car, sleeping, R sidelying. Relieved by: ice, pain medication.    Special tests:  X-ray (see chart 05/24/2017).  Previous treatment: pt states had one visit of PT with focus on ROM through pain clinic earlier this spring.    General health as reported by patient is good.  Pertinent medical history includes:  Overweight, osteoarthritis, fibromyalgia, depression, asthma and thyroid problems (chronic LBP with surgical consult pending).  Medical allergies: yes (see chart).  Surgical history: B TKAs, c section, gastric bypass.  Current medications:  Pain medication, anti-depressants, thyroid medication, sleep medication and muscle relaxants.  Current occupation is retired nurse.        Barriers include:  None as reported by the patient.    Red flags:  None as reported by the patient.    Pt is R dominant.  Pt states her goal is to have \"full range of motion.  I'm hoping that getting the arm moving more will decrease the pain.\"                    Objective:    Standing Alignment:    Cervical/Thoracic:  Forward head  Shoulder/UE:  Rounded shoulders              Gait:  Pt ambulates with single point cane in L " hand                        Cervical/Thoracic Evaluation  Arom wnl cervical: without symptom provocation at shoulder but she noted R lateral neck discomfort with AROM extension, R SB.                                  Shoulder Evaluation:  ROM:  AROM:    Flexion:  Left:  160    Right:  122 positive  Extension: Left: 70Right: 70 negative  Abduction:  Left: 142   Right:  130 positive    Internal Rotation:  Left:  T8    Right:  T8 positive  External Rotation:  Left:  61    Right:  70 negative                PROM:    Flexion:  Right: 148 pain and capsular      Abduction:  Right:  145 pain and capsular                          Strength:    Flexion: Left:4/5    Pain: -    Right: 4/5      Pain:  +  Extension:  Left: 4+/5     Pain:-    Right: 4+/5     Pain:-  Abduction:  Left: 4/5   Pain:-    Right: 3+/5      Pain:+    Internal Rotation:  Left:4+/5      Pain:-    Right: 4/5      Pain:-  External Rotation:   Left:4+/5      Pain:-   Right:4-/5      Pain:+            Stability Testing:      Left shoulder stability negative testing:  Apprehension; Relocation; Load and shift anterior and Load and shift posterior    Right shoulder stability negative testing:  Apprehension; Relocation; Load and shift anterior and Load and shift posterior  Special Tests:      Right shoulder positive for the following special tests:Impingement  Right shoulder negative for the following special tests:Labral and Rotator cuff tear  Palpation:      Right shoulder tenderness present at: Supraspinatus; Infraspinatus; Subscapularis; Upper Trap and Bicipital Groove  Right shoulder tenderness not present at:Teres Minor or Levator  Mobility Tests:    Glenohumeral anterior left:  Normal  Glenohumeral anterior right:  Normal  Glenohumeral posterior left:  Normal  Glenohumeral posterior right:  Normal  Glenohumeral inferior left:  Normal  Glenohumeral inferior right:  Normal                                             General     ROS    Assessment/Plan:       Patient is a 62 year old female with right side shoulder complaints.    Patient has the following significant findings with corresponding treatment plan.                Diagnosis 1:  R shoulder pain  Pain -  hot/cold therapy  Decreased ROM/flexibility - manual therapy and therapeutic exercise  Decreased strength - therapeutic exercise and therapeutic activities  Decreased function - therapeutic activities  Impaired posture - neuro re-education    Therapy Evaluation Codes:   1) History comprised of:   Personal factors that impact the plan of care:      Past/current experiences and Time since onset of symptoms.    Comorbidity factors that impact the plan of care are:      Asthma, Depression, Osteoarthritis and Overweight.     Medications impacting care: Anti-depressant, Muscle relaxant, Pain and Sleep.  2) Examination of Body Systems comprised of:   Body structures and functions that impact the plan of care:      Cervical spine and Shoulder.   Activity limitations that impact the plan of care are:      Lifting and Sleeping.  3) Clinical presentation characteristics are:   Evolving/Changing.  4) Decision-Making    Moderate complexity using standardized patient assessment instrument and/or measureable assessment of functional outcome.  Cumulative Therapy Evaluation is: Moderate complexity.    Previous and current functional limitations:  (See Goal Flow Sheet for this information)    Short term and Long term goals: (See Goal Flow Sheet for this information)     Communication ability:  Patient appears to be able to clearly communicate and understand verbal and written communication and follow directions correctly.  Treatment Explanation - The following has been discussed with the patient:   RX ordered/plan of care  Anticipated outcomes  Possible risks and side effects  This patient would benefit from PT intervention to resume normal activities.   Rehab potential is fair.    Frequency:  1 X week, once daily  Duration:   for 4 weeks  Discharge Plan:  Achieve all LTG.  Independent in home treatment program.  Reach maximal therapeutic benefit.    Please refer to the daily flowsheet for treatment today, total treatment time and time spent performing 1:1 timed codes.

## 2017-07-27 NOTE — MR AVS SNAPSHOT
After Visit Summary   7/27/2017    Violeta Maria    MRN: [id number]           Patient Information     Date Of Birth          1955        Visit Information        Provider Department      7/27/2017 1:55 PM Andrews Dempsey, PT [facility] [place] [facility]        Today's Diagnoses     Acute pain of right shoulder    -  1       Follow-ups after your visit        Your next 10 appointments already scheduled     Jul 31, 2017 12:10 PM CDT   ARANZA Extremity with Rogelio Grover PTA   [facility] [place] [facility] ([facility] [place])    [address]  [address]  [place] [address]   [phone]            Aug 02, 2017  1:20 PM CDT   ARANZA Extremity with Rogelio Grover PTA   [facility] [place] [facility] ([facility] [place])    [address]  [place] [address]   [phone]            Aug 07, 2017 12:10 PM CDT   ARANZA Extremity with Rogelio Grover PTA   [facility] [place] [facility] ([facility] [place])    [address]  [place] [address]   [phone]            Aug 11, 2017 12:50 PM CDT   ARANZA Extremity with Rogelio Grover PTA   [facility] [place] [facility] ([facility] [place])    [address]  [place] [address]   [phone]            Aug 16, 2017 11:30 AM CDT   ARANZA Extremity with Andrews Dempsey PT   [facility] [place] [facility] ([facility] [place])    [address]  [address]  [place] [address]   [phone]            Aug 18, 2017 10:05 AM CDT   ARANZA Extremity with Andrews Dempsey PT   [facility] [place] [facility] ([facility] [place])    [address]  [address]  [place] [address]   [phone]            Aug 21, 2017 10:45 AM CDT   ARANZA Extremity with Andrews Dempsey PT   [facility] [place] [facility] ([facility] [place])    [address]  [address]  [place] [address]    [phone]              Who to contact     If you have questions or need follow up information about today's clinic visit or your schedule please contact [facility] directly at [phone].  Normal or non-critical lab and imaging results will be communicated to you by MyChart, letter or phone within 4 business days after the clinic has received the results. If you do not hear from us within 7 days, please contact the clinic through MyChart or phone. If you have a critical or abnormal lab result, we will notify you by phone as soon as possible.  Submit refill requests through mapp2link or call your pharmacy and they will forward the refill request to us. Please allow 3 business days for your refill to be completed.          Additional Information About Your Visit        [employer] [facility] Information     [email or web address] gives you secure access to your electronic health record. If you see a primary care provider, you can also send messages to your care team and make appointments. If you have questions, please call your primary care clinic.  If you do not have a primary care provider, please call [phone] and they will assist you.        Care EveryWhere ID     This is your Care EveryWhere ID. This could be used by other organizations to access your [facility] medical records  [id number]         Blood Pressure from Last 3 Encounters:   05/24/17 140/74    Weight from Last 3 Encounters:   07/11/17 113.7 kg (250 lb 9.6 oz)   05/24/17 122.5 kg (270 lb)   03/27/17 132.9 kg (293 lb)              We Performed the Following     PT Eval, Moderate Complexity (73876)     Therapeutic Exercises        Primary Care Provider Office Phone # Fax #    Abdon Barahona [phone]       [facility] [address] [facility] [address]        Equal Access to Services     BRYAN PAZ : nelda Padilla, glenn weston  mickie zarate ah. So [facility] [phone].    ATENCIÓN: Si rhysla brodie, tiene a ewing disposición servicios gratuitos de asistencia lingüística. Rosalie ramsay [phone].    We comply with applicable federal civil rights laws and [place] laws. We do not discriminate on the basis of race, color, national origin, age, disability sex, sexual orientation or gender identity.            Thank you!     Thank you for choosing [facility]  for your care. Our goal is always to provide you with excellent care. Hearing back from our patients is one way we can continue to improve our services. Please take a few minutes to complete the written survey that you may receive in the mail after your visit with us. Thank you!             Your Updated Medication List - Protect others around you: Learn how to safely use, store and throw away your medicines at www.disposemymeds.org.          This list is accurate as of: 7/27/17  3:41 PM.  Always use your most recent med list.                   Brand Name Dispense Instructions for use Diagnosis    ALLEGRA PO      Take  by mouth.        CYMBALTA 20 MG EC capsule   Generic drug:  DULoxetine      Take 20 mg by mouth 2 times daily.        FISH OIL           FLEXERIL PO      Take  by mouth as needed.        FLONASE 50 MCG/ACT spray   Generic drug:  fluticasone      Spray 2 sprays into both nostrils daily.        hydrOXYzine 25 MG capsule    VISTARIL    30 capsule    Take 1 capsule (25 mg) by mouth 3 times daily as needed for itching (spasm)    History of total knee replacement, right       LUNESTA 1 MG Tabs tablet   Generic drug:  eszopiclone      Take 1 mg by mouth At Bedtime.        LYRICA 100 MG capsule   Generic drug:  pregabalin      Take  by mouth 3 times daily.        MULTI-VITAMIN DAILY PO      Take  by mouth.        * oxyCODONE-acetaminophen 5-325 MG per tablet    PERCOCET    60 tablet    Take 1 tablet by mouth every 4 hours as needed for pain     History of total knee arthroplasty, right       * oxyCODONE-acetaminophen 5-325 MG per tablet    PERCOCET    30 tablet    Take 1-2 tablets by mouth every 6 hours as needed for pain    Rotator cuff strain, right, initial encounter       predniSONE 20 MG tablet    DELTASONE     Take 20 mg by mouth 2 times daily        RESTASIS 0.05 % ophthalmic emulsion   Generic drug:  cycloSPORINE      1 drop every 12 hours.        SYNTHROID 100 MCG tablet   Generic drug:  levothyroxine      Take  by mouth daily.        TRAZODONE HCL PO      Take  by mouth.        ULTRAM 50 MG tablet   Generic drug:  traMADol      Take 50 mg by mouth every 6 hours as needed.        VICODIN PO      Take  by mouth as needed.        ZOLOFT PO      Take  by mouth daily.        * Notice:  This list has 2 medication(s) that are the same as other medications prescribed for you. Read the directions carefully, and ask your doctor or other care provider to review them with you.

## 2017-08-11 ENCOUNTER — THERAPY VISIT (OUTPATIENT)
Dept: PHYSICAL THERAPY | Facility: CLINIC | Age: 62
End: 2017-08-11
Payer: COMMERCIAL

## 2017-08-11 DIAGNOSIS — M25.511 ACUTE PAIN OF RIGHT SHOULDER: ICD-10-CM

## 2017-08-11 PROCEDURE — 97110 THERAPEUTIC EXERCISES: CPT | Mod: GP | Performed by: PHYSICAL THERAPIST

## 2017-08-11 PROCEDURE — 97112 NEUROMUSCULAR REEDUCATION: CPT | Mod: GP | Performed by: PHYSICAL THERAPIST

## 2017-08-12 ENCOUNTER — HEALTH MAINTENANCE LETTER (OUTPATIENT)
Age: 62
End: 2017-08-12

## 2017-08-16 ENCOUNTER — THERAPY VISIT (OUTPATIENT)
Dept: PHYSICAL THERAPY | Facility: CLINIC | Age: 62
End: 2017-08-16
Payer: COMMERCIAL

## 2017-08-16 DIAGNOSIS — M25.511 ACUTE PAIN OF RIGHT SHOULDER: ICD-10-CM

## 2017-08-16 PROCEDURE — 97112 NEUROMUSCULAR REEDUCATION: CPT | Mod: GP | Performed by: PHYSICAL THERAPIST

## 2017-08-16 PROCEDURE — 97110 THERAPEUTIC EXERCISES: CPT | Mod: GP | Performed by: PHYSICAL THERAPIST

## 2017-08-18 ENCOUNTER — THERAPY VISIT (OUTPATIENT)
Dept: PHYSICAL THERAPY | Facility: CLINIC | Age: 62
End: 2017-08-18
Payer: COMMERCIAL

## 2017-08-18 DIAGNOSIS — M25.511 ACUTE PAIN OF RIGHT SHOULDER: ICD-10-CM

## 2017-08-18 PROCEDURE — 97110 THERAPEUTIC EXERCISES: CPT | Mod: GP | Performed by: PHYSICAL THERAPIST

## 2017-08-18 PROCEDURE — 97112 NEUROMUSCULAR REEDUCATION: CPT | Mod: GP | Performed by: PHYSICAL THERAPIST

## 2017-08-31 ENCOUNTER — THERAPY VISIT (OUTPATIENT)
Dept: PHYSICAL THERAPY | Facility: CLINIC | Age: 62
End: 2017-08-31
Payer: COMMERCIAL

## 2017-08-31 DIAGNOSIS — M25.511 ACUTE PAIN OF RIGHT SHOULDER: ICD-10-CM

## 2017-08-31 PROCEDURE — 97110 THERAPEUTIC EXERCISES: CPT | Mod: GP

## 2017-08-31 PROCEDURE — 97112 NEUROMUSCULAR REEDUCATION: CPT | Mod: GP

## 2017-09-13 ENCOUNTER — THERAPY VISIT (OUTPATIENT)
Dept: PHYSICAL THERAPY | Facility: CLINIC | Age: 62
End: 2017-09-13
Payer: COMMERCIAL

## 2017-09-13 DIAGNOSIS — M25.511 ACUTE PAIN OF RIGHT SHOULDER: ICD-10-CM

## 2017-09-13 PROCEDURE — 97112 NEUROMUSCULAR REEDUCATION: CPT | Mod: GP | Performed by: PHYSICAL THERAPIST

## 2017-09-13 PROCEDURE — 97110 THERAPEUTIC EXERCISES: CPT | Mod: GP | Performed by: PHYSICAL THERAPIST

## 2018-09-06 ENCOUNTER — OFFICE VISIT (OUTPATIENT)
Dept: ORTHOPEDICS | Facility: CLINIC | Age: 63
End: 2018-09-06
Payer: COMMERCIAL

## 2018-09-06 VITALS
HEIGHT: 64 IN | SYSTOLIC BLOOD PRESSURE: 129 MMHG | BODY MASS INDEX: 38.58 KG/M2 | WEIGHT: 226 LBS | DIASTOLIC BLOOD PRESSURE: 86 MMHG | HEART RATE: 75 BPM

## 2018-09-06 DIAGNOSIS — M75.81 TENDINITIS OF RIGHT ROTATOR CUFF: Primary | ICD-10-CM

## 2018-09-06 DIAGNOSIS — E66.01 MORBID OBESITY (H): ICD-10-CM

## 2018-09-06 DIAGNOSIS — M75.102 TEAR OF LEFT ROTATOR CUFF, UNSPECIFIED TEAR EXTENT: ICD-10-CM

## 2018-09-06 PROCEDURE — 20610 DRAIN/INJ JOINT/BURSA W/O US: CPT | Mod: 50 | Performed by: ORTHOPAEDIC SURGERY

## 2018-09-06 PROCEDURE — 99213 OFFICE O/P EST LOW 20 MIN: CPT | Mod: 25 | Performed by: ORTHOPAEDIC SURGERY

## 2018-09-06 RX ORDER — METHYLPREDNISOLONE ACETATE 80 MG/ML
80 INJECTION, SUSPENSION INTRA-ARTICULAR; INTRALESIONAL; INTRAMUSCULAR; SOFT TISSUE
Status: SHIPPED | OUTPATIENT
Start: 2018-09-06

## 2018-09-06 RX ORDER — LIDOCAINE HYDROCHLORIDE 10 MG/ML
1 INJECTION, SOLUTION INFILTRATION; PERINEURAL
Status: SHIPPED | OUTPATIENT
Start: 2018-09-06

## 2018-09-06 RX ADMIN — METHYLPREDNISOLONE ACETATE 80 MG: 80 INJECTION, SUSPENSION INTRA-ARTICULAR; INTRALESIONAL; INTRAMUSCULAR; SOFT TISSUE at 11:49

## 2018-09-06 RX ADMIN — LIDOCAINE HYDROCHLORIDE 1 ML: 10 INJECTION, SOLUTION INFILTRATION; PERINEURAL at 11:49

## 2018-09-06 NOTE — PROGRESS NOTES
"SUBJECTIVE:  Violeta Maria is here in follow up of calcific rotator cuff tendinitis and possible small rotator cuff tear of the right shoulder.     The patient is also here for left shoulder pain. An MRI from 2006 only revealed mild tendinitis of the left shoulder. Dr. Americo Velazquez thought her pain was a combination of left shoulder impingement and fibromyalgia. According to the chart, she has had a couple injection in the left shoulder which was over 10 years ago. She states that it has been \"way more than 1 year\" since her previous steroid injection in the left shoulder.    Last injection(s):   1. Right Shoulder subacromial steroid injection on 05/24/17. Length of effectiveness: 5 weeks of complete relief  2. Right Shoulder subacromial steroid injection on 07/11/17. Length of effectiveness: 10-11 months    Present symptoms: shoulder pain R>L, pain reaching overhead and away from the body, and positional night pain  Treatment up to this point: steroid injections  No definite weakness.    Review of Systems:  Constitutional/General: Negative for fever, chills, change in weight  Integumentary/Skin: Negative for worrisome rashes, moles, or lesions  Neuro: Negative for weakness, dizziness, or paresthesias   Psychiatric: negative for changes in mood or affect    OBJECTIVE:  Physical Exam:  /86  Pulse 75  Ht 1.626 m (5' 4\")  Wt 102.5 kg (226 lb)  BMI 38.79 kg/m2  General Appearance: healthy, alert and no distress   Skin: no suspicious lesions or rashes  Neuro: Normal strength and tone, mentation intact and speech normal  Vascular: good pulses, and capillary refill   Lymph: no lymphadenopathy   Psych:  mentation appears normal and affect normal/bright  Resp: no increased work of breathing    Bilateral Shoulder Exam:   Left Shoulder Right Shoulder   Tender AC joint, proximal bicep tendon and greater tuberosity AC joint, proximal bicep tendon and greater tuberosity    PROM: forward flexion 140 degrees 140 " degrees   Strength: forward flexion 4+/5 5/5   Strength: external rotation  5-/5 5/5   Impingements (Mckinney, Neer, and AER) 0, 0, 0  0, 0, 0      ASSESSMENT:  1. Rotator cuff tendinitis, left shoulder  2. Suspected rotator cuff tear, left shoulder  3. Calcific rotator cuff tendinitis, right shoulder  4. Possible small rotator cuff tear, right shoulder    PLAN:  She would like to avoid rotator cuff surgery if possible.  We decided to proceed with bilateral subacromial injections today.   I emphasized that the patient work on the exercises that she learned in physical therapy for both shoulder. Of note, the patient has an upcoming spinal cord stimulator implantion but reports that she will be able to have an MRI with the implant, so doing a shoulder MRI now is not necessary.    Informed consent was obtained. Bilateral shoulder injected into the subacromial space after sterile prep, using 80mg Depomedrol and 4cc local anesthetic.    Return to clinic: PRN, new x-rays of the left shoulder when she returns    PRANAV Robles MD  Dept. Orthopedic Surgery  [facility]    This document serves as a record of the services and decisions personally performed and made by Dr. PRANAV Robles MD. It was created on his behalf by Tony Chatman, a trained medical scribe. The creation of this record is based on the provider's personal observations and the statements of the patient. This document has been checked and approved by the attending provider.   Tony Chatman September 6, 2018 11:43 AM

## 2018-09-06 NOTE — MR AVS SNAPSHOT
After Visit Summary   9/6/2018    Violeta Maria    MRN: [id number]           Patient Information     Date Of Birth          1955        Visit Information        Provider Department      9/6/2018 11:15 AM Rodolfo Robles MD [facility]        Today's Diagnoses     Tendinitis of right rotator cuff    -  1    Tear of left rotator cuff, unspecified tear extent        Morbid obesity (H)           Follow-ups after your visit        Your next 10 appointments already scheduled     Oct 18, 2018   Procedure with Caty Serra DO   [facility] (--)    [address]  [facility] [address] [phone]              Who to contact     If you have questions or need follow up information about today's clinic visit or your schedule please contact [facility] directly at [phone].  Normal or non-critical lab and imaging results will be communicated to you by MyChart, letter or phone within 4 business days after the clinic has received the results. If you do not hear from us within 7 days, please contact the clinic through MyChart or phone. If you have a critical or abnormal lab result, we will notify you by phone as soon as possible.  Submit refill requests through [employer] or call your pharmacy and they will forward the refill request to us. Please allow 3 business days for your refill to be completed.          Additional Information About Your Visit        MyChart Information     [employer] gives you secure access to your electronic health record. If you see a primary care provider, you can also send messages to your care team and make appointments. If you have questions, please call your primary care clinic.  If you do not have a primary care provider, please call [phone] and they will assist you.        Care EveryWhere ID     This is your Care EveryWhere ID. This could be used by other organizations to access your [facility]  "records  [id number]        Your Vitals Were     Pulse Height BMI (Body Mass Index)             75 1.626 m (5' 4\") 38.79 kg/m2          Blood Pressure from Last 3 Encounters:   09/06/18 129/86   05/24/17 140/74    Weight from Last 3 Encounters:   09/06/18 102.5 kg (226 lb)   07/11/17 113.7 kg (250 lb 9.6 oz)   05/24/17 122.5 kg (270 lb)              We Performed the Following     Large Joint Injection/Arthocentesis        Primary Care Provider Office Phone # Fax #    Abdon Eitan Barahona [phone]       [address] [facility] [address]        Equal Access to Services     BRYAN PAZ : Hadii cheri dorano Sokofi, waaxda luqadaha, qaybta kaalmada adeegyada, glenn zarate . So [facility] [phone].    ATENCIÓN: Si habla español, tiene a ewing disposición servicios gratuitos de asistencia lingüística. [facility] [phone].    We comply with applicable federal civil rights laws and [place] laws. We do not discriminate on the basis of race, color, national origin, age, disability, sex, sexual orientation, or gender identity.            Thank you!     [facility] you for [facility]. Our goal is always to provide you with excellent care. Hearing back from our patients is one way we can continue to improve our services. Please take a few minutes to complete the written survey that you may receive in the mail after your visit with us. Thank you!             Your Updated Medication List - Protect others around you: Learn how to safely use, store and throw away your medicines at www.disposemymeds.org.          This list is accurate as of 9/6/18 12:15 PM.  Always use your most recent med list.                   Brand Name Dispense Instructions for use Diagnosis    ALLEGRA PO      Take  by mouth.        CYMBALTA 20 MG EC capsule   Generic drug:  DULoxetine      Take 20 mg by mouth 2 times daily.        FISH OIL           FLEXERIL PO      " Take  by mouth as needed.        FLONASE 50 MCG/ACT spray   Generic drug:  fluticasone      Spray 2 sprays into both nostrils daily.        hydrOXYzine 25 MG capsule    VISTARIL    30 capsule    Take 1 capsule (25 mg) by mouth 3 times daily as needed for itching (spasm)    History of total knee replacement, right       LUNESTA 1 MG Tabs tablet   Generic drug:  eszopiclone      Take 1 mg by mouth At Bedtime.        LYRICA 100 MG capsule   Generic drug:  pregabalin      Take  by mouth 3 times daily.        MULTI-VITAMIN DAILY PO      Take  by mouth.        * oxyCODONE-acetaminophen 5-325 MG per tablet    PERCOCET    60 tablet    Take 1 tablet by mouth every 4 hours as needed for pain    History of total knee arthroplasty, right       * oxyCODONE-acetaminophen 5-325 MG per tablet    PERCOCET    30 tablet    Take 1-2 tablets by mouth every 6 hours as needed for pain    Rotator cuff strain, right, initial encounter       predniSONE 20 MG tablet    DELTASONE     Take 20 mg by mouth 2 times daily        RESTASIS 0.05 % ophthalmic emulsion   Generic drug:  cycloSPORINE      1 drop every 12 hours.        SYNTHROID 100 MCG tablet   Generic drug:  levothyroxine      Take  by mouth daily.        TRAZODONE HCL PO      Take  by mouth.        ULTRAM 50 MG tablet   Generic drug:  traMADol      Take 50 mg by mouth every 6 hours as needed.        VICODIN PO      Take  by mouth as needed.        ZOLOFT PO      Take  by mouth daily.        * Notice:  This list has 2 medication(s) that are the same as other medications prescribed for you. Read the directions carefully, and ask your doctor or other care provider to review them with you.

## 2018-09-06 NOTE — PROGRESS NOTES
Large Joint Injection/Arthocentesis  Date/Time: 9/6/2018 11:49 AM  Performed by: KHADRA GRAY  Authorized by: KHADRA GRAY     Indications:  Pain  Needle Size:  22 G  Guidance: landmark guided    Location:  Shoulder  Laterality:  Bilateral  Site:  Bilateral subacromial bursa  Medications (Right):  1 mL lidocaine 1 %; 80 mg methylPREDNISolone acetate 80 MG/ML  Medications (Left):  1 mL lidocaine 1 %; 80 mg methylPREDNISolone acetate 80 MG/ML  Outcome:  Tolerated well, no immediate complications  Consent Given by:  Patient  Prep: patient was prepped and draped in usual sterile fashion

## 2018-09-06 NOTE — LETTER
"    9/6/2018         RE: Violeta Maria  [address] [facility] [place] [address]        Dear Colleague,    Thank you for referring your patient, Violeta Maria, to the [facility]. Please see a copy of my visit note below.    SUBJECTIVE:  Violeta Maria is here in follow up of calcific rotator cuff tendinitis and possible small rotator cuff tear of the right shoulder.     The patient is also here for left shoulder pain. An MRI from 2006 only revealed mild tendinitis of the left shoulder. Dr. Americo Velazquez thought her pain was a combination of left shoulder impingement and fibromyalgia. According to the chart, she has had a couple injection in the left shoulder which was over 10 years ago. She states that it has been \"way more than 1 year\" since her previous steroid injection in the left shoulder.    Last injection(s):   1. Right Shoulder subacromial steroid injection on 05/24/17. Length of effectiveness: 5 weeks of complete relief  2. Right Shoulder subacromial steroid injection on 07/11/17. Length of effectiveness: 10-11 months    Present symptoms: shoulder pain R>L, pain reaching overhead and away from the body, and positional night pain  Treatment up to this point: steroid injections  No definite weakness.    Review of Systems:  Constitutional/General: Negative for fever, chills, change in weight  Integumentary/Skin: Negative for worrisome rashes, moles, or lesions  Neuro: Negative for weakness, dizziness, or paresthesias   Psychiatric: negative for changes in mood or affect    OBJECTIVE:  Physical Exam:  /86  Pulse 75  Ht 1.626 m (5' 4\")  Wt 102.5 kg (226 lb)  BMI 38.79 kg/m2  General Appearance: healthy, alert and no distress   Skin: no suspicious lesions or rashes  Neuro: Normal strength and tone, mentation intact and speech normal  Vascular: good pulses, and capillary refill   Lymph: no lymphadenopathy   Psych:  mentation appears normal and affect normal/bright  Resp: no " increased work of breathing    Bilateral Shoulder Exam:   Left Shoulder Right Shoulder   Tender AC joint, proximal bicep tendon and greater tuberosity AC joint, proximal bicep tendon and greater tuberosity    PROM: forward flexion 140 degrees 140 degrees   Strength: forward flexion 4+/5 5/5   Strength: external rotation  5-/5 5/5   Impingements (Mckinney, Neer, and AER) 0, 0, 0  0, 0, 0      ASSESSMENT:  1. Rotator cuff tendinitis, left shoulder  2. Suspected rotator cuff tear, left shoulder  3. Calcific rotator cuff tendinitis, right shoulder  4. Possible small rotator cuff tear, right shoulder    PLAN:  She would like to avoid rotator cuff surgery if possible.  We decided to proceed with bilateral subacromial injections today.   I emphasized that the patient work on the exercises that she learned in physical therapy for both shoulder. Of note, the patient has an upcoming spinal cord stimulator implantion but reports that she will be able to have an MRI with the implant, so doing a shoulder MRI now is not necessary.    Informed consent was obtained. Bilateral shoulder injected into the subacromial space after sterile prep, using 80mg Depomedrol and 4cc local anesthetic.    Return to clinic: PRN, new x-rays of the left shoulder when she returns    PRANAV Gray MD  Dept. Orthopedic Surgery  [facility]    This document serves as a record of the services and decisions personally performed and made by Dr. PRANAV Gray MD. It was created on his behalf by Tony Chatman, a trained medical scribe. The creation of this record is based on the provider's personal observations and the statements of the patient. This document has been checked and approved by the attending provider.   Tony Chatman September 6, 2018 11:43 AM    Large Joint Injection/Arthocentesis  Date/Time: 9/6/2018 11:49 AM  Performed by: KHADRA GRAY  Authorized by: KHADRA GRAY     Indications:  Pain  Needle Size:  22  G  Guidance: landmark guided    Location:  Shoulder  Laterality:  Bilateral  Site:  Bilateral subacromial bursa  Medications (Right):  1 mL lidocaine 1 %; 80 mg methylPREDNISolone acetate 80 MG/ML  Medications (Left):  1 mL lidocaine 1 %; 80 mg methylPREDNISolone acetate 80 MG/ML  Outcome:  Tolerated well, no immediate complications  Consent Given by:  Patient  Prep: patient was prepped and draped in usual sterile fashion              Again, thank you for allowing me to participate in the care of your patient.        Sincerely,        Rodolfo Robles MD

## 2018-10-17 RX ORDER — ESTRADIOL 0.1 MG/G
2 CREAM VAGINAL DAILY PRN
COMMUNITY

## 2018-10-17 RX ORDER — KETOCONAZOLE 20 MG/G
CREAM TOPICAL DAILY PRN
COMMUNITY

## 2018-10-17 RX ORDER — AZELASTINE 1 MG/ML
1 SPRAY, METERED NASAL 2 TIMES DAILY
COMMUNITY

## 2018-10-17 RX ORDER — FLUCONAZOLE 200 MG/1
200 TABLET ORAL DAILY PRN
COMMUNITY

## 2018-10-17 RX ORDER — MONTELUKAST SODIUM 10 MG/1
10 TABLET ORAL DAILY
COMMUNITY

## 2018-10-17 RX ORDER — NYSTATIN 100000/ML
500000 SUSPENSION, ORAL (FINAL DOSE FORM) ORAL 4 TIMES DAILY PRN
Status: ON HOLD | COMMUNITY
End: 2019-11-21

## 2018-10-17 RX ORDER — ALPRAZOLAM 0.5 MG
.25-.5 TABLET ORAL DAILY PRN
COMMUNITY

## 2018-10-17 RX ORDER — BUMETANIDE 1 MG/1
1 TABLET ORAL DAILY PRN
COMMUNITY

## 2018-10-17 RX ORDER — METAXALONE 800 MG/1
800 TABLET ORAL 3 TIMES DAILY
COMMUNITY

## 2018-10-17 RX ORDER — IPRATROPIUM BROMIDE AND ALBUTEROL SULFATE 2.5; .5 MG/3ML; MG/3ML
1 SOLUTION RESPIRATORY (INHALATION) EVERY 6 HOURS PRN
COMMUNITY
End: 2023-07-16

## 2018-10-17 RX ORDER — MUPIROCIN 20 MG/G
OINTMENT TOPICAL 3 TIMES DAILY PRN
COMMUNITY

## 2018-10-17 RX ORDER — LAMOTRIGINE 150 MG/1
300 TABLET ORAL DAILY
COMMUNITY

## 2018-10-17 RX ORDER — ALBUTEROL SULFATE 0.83 MG/ML
2.5 SOLUTION RESPIRATORY (INHALATION) EVERY 6 HOURS PRN
COMMUNITY

## 2018-10-17 RX ORDER — ALBUTEROL SULFATE 90 UG/1
2 AEROSOL, METERED RESPIRATORY (INHALATION) EVERY 4 HOURS PRN
COMMUNITY

## 2018-10-17 RX ORDER — NYSTATIN AND TRIAMCINOLONE ACETONIDE 100000; 1 [USP'U]/G; MG/G
CREAM TOPICAL 2 TIMES DAILY PRN
COMMUNITY

## 2018-10-17 RX ORDER — FLUOROMETHOLONE 0.1 %
1 SUSPENSION, DROPS(FINAL DOSAGE FORM)(ML) OPHTHALMIC (EYE) EVERY OTHER DAY
COMMUNITY

## 2018-10-17 RX ORDER — NYSTATIN 100000 [USP'U]/G
POWDER TOPICAL 2 TIMES DAILY PRN
COMMUNITY

## 2018-10-17 RX ORDER — METOCLOPRAMIDE 10 MG/1
10 TABLET ORAL EVERY 6 HOURS PRN
COMMUNITY

## 2018-10-18 ENCOUNTER — ANESTHESIA (OUTPATIENT)
Dept: SURGERY | Facility: CLINIC | Age: 63
End: 2018-10-18
Payer: COMMERCIAL

## 2018-10-18 ENCOUNTER — HOSPITAL ENCOUNTER (OUTPATIENT)
Facility: CLINIC | Age: 63
Discharge: HOME | End: 2018-10-18
Attending: PHYSICAL MEDICINE & REHABILITATION | Admitting: PHYSICAL MEDICINE & REHABILITATION
Payer: COMMERCIAL

## 2018-10-18 ENCOUNTER — APPOINTMENT (OUTPATIENT)
Dept: GENERAL RADIOLOGY | Facility: CLINIC | Age: 63
End: 2018-10-18
Attending: PHYSICAL MEDICINE & REHABILITATION
Payer: COMMERCIAL

## 2018-10-18 ENCOUNTER — ANESTHESIA EVENT (OUTPATIENT)
Dept: SURGERY | Facility: CLINIC | Age: 63
End: 2018-10-18
Payer: COMMERCIAL

## 2018-10-18 VITALS
BODY MASS INDEX: 38.16 KG/M2 | DIASTOLIC BLOOD PRESSURE: 88 MMHG | TEMPERATURE: 98 F | HEIGHT: 64 IN | WEIGHT: 223.5 LBS | OXYGEN SATURATION: 98 % | HEART RATE: 70 BPM | SYSTOLIC BLOOD PRESSURE: 124 MMHG | RESPIRATION RATE: 16 BRPM

## 2018-10-18 PROCEDURE — 37000008 ZZH ANESTHESIA TECHNICAL FEE, 1ST 30 MIN: Performed by: PHYSICAL MEDICINE & REHABILITATION

## 2018-10-18 PROCEDURE — 36000063 ZZH SURGERY LEVEL 4 EA 15 ADDTL MIN: Performed by: PHYSICAL MEDICINE & REHABILITATION

## 2018-10-18 PROCEDURE — 36000065 ZZH SURGERY LEVEL 4 W FLUORO 1ST 30 MIN: Performed by: PHYSICAL MEDICINE & REHABILITATION

## 2018-10-18 PROCEDURE — 27210794 ZZH OR GENERAL SUPPLY STERILE: Performed by: PHYSICAL MEDICINE & REHABILITATION

## 2018-10-18 PROCEDURE — 25000128 H RX IP 250 OP 636: Performed by: NURSE ANESTHETIST, CERTIFIED REGISTERED

## 2018-10-18 PROCEDURE — 37000009 ZZH ANESTHESIA TECHNICAL FEE, EACH ADDTL 15 MIN: Performed by: PHYSICAL MEDICINE & REHABILITATION

## 2018-10-18 PROCEDURE — 25000125 ZZHC RX 250: Performed by: PHYSICAL MEDICINE & REHABILITATION

## 2018-10-18 PROCEDURE — 25000132 ZZH RX MED GY IP 250 OP 250 PS 637: Performed by: PHYSICAL MEDICINE & REHABILITATION

## 2018-10-18 PROCEDURE — 71000027 ZZH RECOVERY PHASE 2 EACH 15 MINS: Performed by: PHYSICAL MEDICINE & REHABILITATION

## 2018-10-18 PROCEDURE — 71000013 ZZH RECOVERY PHASE 1 LEVEL 1 EA ADDTL HR: Performed by: PHYSICAL MEDICINE & REHABILITATION

## 2018-10-18 PROCEDURE — C1778 LEAD, NEUROSTIMULATOR: HCPCS | Performed by: PHYSICAL MEDICINE & REHABILITATION

## 2018-10-18 PROCEDURE — 40000170 ZZH STATISTIC PRE-PROCEDURE ASSESSMENT II: Performed by: PHYSICAL MEDICINE & REHABILITATION

## 2018-10-18 PROCEDURE — 27211024 ZZHC OR SUPPLY OTHER OPNP: Performed by: PHYSICAL MEDICINE & REHABILITATION

## 2018-10-18 PROCEDURE — 25000125 ZZHC RX 250: Performed by: NURSE ANESTHETIST, CERTIFIED REGISTERED

## 2018-10-18 PROCEDURE — 25000128 H RX IP 250 OP 636: Performed by: ANESTHESIOLOGY

## 2018-10-18 PROCEDURE — 27810325 ZZHC OR IMPLANT OTHER OPNP: Performed by: PHYSICAL MEDICINE & REHABILITATION

## 2018-10-18 PROCEDURE — 40000277 XR SURGERY CARM FLUORO LESS THAN 5 MIN W STILLS

## 2018-10-18 PROCEDURE — 25000132 ZZH RX MED GY IP 250 OP 250 PS 637: Performed by: ANESTHESIOLOGY

## 2018-10-18 PROCEDURE — C1822 GEN, NEURO, HF, RECHG BAT: HCPCS | Performed by: PHYSICAL MEDICINE & REHABILITATION

## 2018-10-18 PROCEDURE — 71000012 ZZH RECOVERY PHASE 1 LEVEL 1 FIRST HR: Performed by: PHYSICAL MEDICINE & REHABILITATION

## 2018-10-18 PROCEDURE — C1787 PATIENT PROGR, NEUROSTIM: HCPCS | Performed by: PHYSICAL MEDICINE & REHABILITATION

## 2018-10-18 RX ORDER — ONDANSETRON 2 MG/ML
4 INJECTION INTRAMUSCULAR; INTRAVENOUS EVERY 30 MIN PRN
Status: DISCONTINUED | OUTPATIENT
Start: 2018-10-18 | End: 2018-10-18 | Stop reason: HOSPADM

## 2018-10-18 RX ORDER — LIDOCAINE HYDROCHLORIDE AND EPINEPHRINE 10; 10 MG/ML; UG/ML
INJECTION, SOLUTION INFILTRATION; PERINEURAL PRN
Status: DISCONTINUED | OUTPATIENT
Start: 2018-10-18 | End: 2018-10-18 | Stop reason: HOSPADM

## 2018-10-18 RX ORDER — ONDANSETRON 2 MG/ML
INJECTION INTRAMUSCULAR; INTRAVENOUS PRN
Status: DISCONTINUED | OUTPATIENT
Start: 2018-10-18 | End: 2018-10-18

## 2018-10-18 RX ORDER — GLYCOPYRROLATE 0.2 MG/ML
INJECTION, SOLUTION INTRAMUSCULAR; INTRAVENOUS PRN
Status: DISCONTINUED | OUTPATIENT
Start: 2018-10-18 | End: 2018-10-18

## 2018-10-18 RX ORDER — HYDROMORPHONE HYDROCHLORIDE 1 MG/ML
.3-.5 INJECTION, SOLUTION INTRAMUSCULAR; INTRAVENOUS; SUBCUTANEOUS EVERY 10 MIN PRN
Status: DISCONTINUED | OUTPATIENT
Start: 2018-10-18 | End: 2018-10-18 | Stop reason: HOSPADM

## 2018-10-18 RX ORDER — NALOXONE HYDROCHLORIDE 0.4 MG/ML
.1-.4 INJECTION, SOLUTION INTRAMUSCULAR; INTRAVENOUS; SUBCUTANEOUS
Status: DISCONTINUED | OUTPATIENT
Start: 2018-10-18 | End: 2018-10-18 | Stop reason: HOSPADM

## 2018-10-18 RX ORDER — OXYCODONE HCL 10 MG/1
10 TABLET, FILM COATED, EXTENDED RELEASE ORAL ONCE
Status: COMPLETED | OUTPATIENT
Start: 2018-10-18 | End: 2018-10-18

## 2018-10-18 RX ORDER — MEPERIDINE HYDROCHLORIDE 25 MG/ML
12.5 INJECTION INTRAMUSCULAR; INTRAVENOUS; SUBCUTANEOUS
Status: DISCONTINUED | OUTPATIENT
Start: 2018-10-18 | End: 2018-10-18 | Stop reason: HOSPADM

## 2018-10-18 RX ORDER — FENTANYL CITRATE 50 UG/ML
25-50 INJECTION, SOLUTION INTRAMUSCULAR; INTRAVENOUS
Status: DISCONTINUED | OUTPATIENT
Start: 2018-10-18 | End: 2018-10-18 | Stop reason: HOSPADM

## 2018-10-18 RX ORDER — LIDOCAINE 40 MG/G
CREAM TOPICAL
Status: DISCONTINUED | OUTPATIENT
Start: 2018-10-18 | End: 2018-10-18 | Stop reason: HOSPADM

## 2018-10-18 RX ORDER — HYDROXYZINE HYDROCHLORIDE 25 MG/1
25 TABLET, FILM COATED ORAL ONCE
Status: COMPLETED | OUTPATIENT
Start: 2018-10-18 | End: 2018-10-18

## 2018-10-18 RX ORDER — GUAIFENESIN/DEXTROMETHORPHAN 100-10MG/5
10 SYRUP ORAL 2 TIMES DAILY PRN
COMMUNITY

## 2018-10-18 RX ORDER — FERROUS SULFATE 325(65) MG
325 TABLET ORAL DAILY
Status: ON HOLD | COMMUNITY
End: 2019-11-21

## 2018-10-18 RX ORDER — ONDANSETRON 4 MG/1
4 TABLET, ORALLY DISINTEGRATING ORAL EVERY 30 MIN PRN
Status: DISCONTINUED | OUTPATIENT
Start: 2018-10-18 | End: 2018-10-18 | Stop reason: HOSPADM

## 2018-10-18 RX ORDER — SODIUM CHLORIDE, SODIUM LACTATE, POTASSIUM CHLORIDE, CALCIUM CHLORIDE 600; 310; 30; 20 MG/100ML; MG/100ML; MG/100ML; MG/100ML
INJECTION, SOLUTION INTRAVENOUS CONTINUOUS PRN
Status: DISCONTINUED | OUTPATIENT
Start: 2018-10-18 | End: 2018-10-18

## 2018-10-18 RX ORDER — LEVOTHYROXINE SODIUM 125 UG/1
125 TABLET ORAL DAILY
COMMUNITY

## 2018-10-18 RX ORDER — OXYCODONE HYDROCHLORIDE 5 MG/1
5 TABLET ORAL ONCE
Status: DISCONTINUED | OUTPATIENT
Start: 2018-10-18 | End: 2018-10-18 | Stop reason: CLARIF

## 2018-10-18 RX ORDER — FENTANYL CITRATE 50 UG/ML
INJECTION, SOLUTION INTRAMUSCULAR; INTRAVENOUS PRN
Status: DISCONTINUED | OUTPATIENT
Start: 2018-10-18 | End: 2018-10-18

## 2018-10-18 RX ORDER — ALBUTEROL SULFATE 0.83 MG/ML
2.5 SOLUTION RESPIRATORY (INHALATION) EVERY 4 HOURS PRN
Status: DISCONTINUED | OUTPATIENT
Start: 2018-10-18 | End: 2018-10-18 | Stop reason: HOSPADM

## 2018-10-18 RX ORDER — OXYCODONE HYDROCHLORIDE 5 MG/1
10 TABLET ORAL ONCE
Status: CANCELLED | OUTPATIENT
Start: 2018-10-18

## 2018-10-18 RX ORDER — PROPOFOL 10 MG/ML
INJECTION, EMULSION INTRAVENOUS PRN
Status: DISCONTINUED | OUTPATIENT
Start: 2018-10-18 | End: 2018-10-18

## 2018-10-18 RX ORDER — DULOXETIN HYDROCHLORIDE 60 MG/1
60 CAPSULE, DELAYED RELEASE ORAL 2 TIMES DAILY
COMMUNITY

## 2018-10-18 RX ORDER — LIDOCAINE 40 MG/G
CREAM TOPICAL 3 TIMES DAILY PRN
COMMUNITY

## 2018-10-18 RX ORDER — OXYCODONE HYDROCHLORIDE 5 MG/1
10 TABLET ORAL ONCE
Status: COMPLETED | OUTPATIENT
Start: 2018-10-18 | End: 2018-10-18

## 2018-10-18 RX ORDER — KETAMINE HYDROCHLORIDE 10 MG/ML
INJECTION, SOLUTION INTRAMUSCULAR; INTRAVENOUS PRN
Status: DISCONTINUED | OUTPATIENT
Start: 2018-10-18 | End: 2018-10-18

## 2018-10-18 RX ORDER — MULTIVITAMIN/IRON/FOLIC ACID 18MG-0.4MG
15 TABLET ORAL DAILY
COMMUNITY

## 2018-10-18 RX ORDER — MAGNESIUM HYDROXIDE 1200 MG/15ML
LIQUID ORAL PRN
Status: DISCONTINUED | OUTPATIENT
Start: 2018-10-18 | End: 2018-10-18 | Stop reason: HOSPADM

## 2018-10-18 RX ORDER — PROPOFOL 10 MG/ML
INJECTION, EMULSION INTRAVENOUS CONTINUOUS PRN
Status: DISCONTINUED | OUTPATIENT
Start: 2018-10-18 | End: 2018-10-18

## 2018-10-18 RX ORDER — CLINDAMYCIN PHOSPHATE 900 MG/50ML
900 INJECTION, SOLUTION INTRAVENOUS
Status: COMPLETED | OUTPATIENT
Start: 2018-10-18 | End: 2018-10-18

## 2018-10-18 RX ORDER — ACETAMINOPHEN 325 MG/1
975 TABLET ORAL ONCE
Status: COMPLETED | OUTPATIENT
Start: 2018-10-18 | End: 2018-10-18

## 2018-10-18 RX ORDER — SODIUM CHLORIDE, SODIUM LACTATE, POTASSIUM CHLORIDE, CALCIUM CHLORIDE 600; 310; 30; 20 MG/100ML; MG/100ML; MG/100ML; MG/100ML
INJECTION, SOLUTION INTRAVENOUS CONTINUOUS
Status: DISCONTINUED | OUTPATIENT
Start: 2018-10-18 | End: 2018-10-18 | Stop reason: HOSPADM

## 2018-10-18 RX ORDER — DEXAMETHASONE SODIUM PHOSPHATE 4 MG/ML
INJECTION, SOLUTION INTRA-ARTICULAR; INTRALESIONAL; INTRAMUSCULAR; INTRAVENOUS; SOFT TISSUE PRN
Status: DISCONTINUED | OUTPATIENT
Start: 2018-10-18 | End: 2018-10-18

## 2018-10-18 RX ORDER — HYDRALAZINE HYDROCHLORIDE 20 MG/ML
2.5-5 INJECTION INTRAMUSCULAR; INTRAVENOUS EVERY 10 MIN PRN
Status: DISCONTINUED | OUTPATIENT
Start: 2018-10-18 | End: 2018-10-18 | Stop reason: HOSPADM

## 2018-10-18 RX ADMIN — PHENYLEPHRINE HYDROCHLORIDE 50 MCG: 10 INJECTION, SOLUTION INTRAMUSCULAR; INTRAVENOUS; SUBCUTANEOUS at 10:43

## 2018-10-18 RX ADMIN — PROPOFOL 15 MG: 10 INJECTION, EMULSION INTRAVENOUS at 11:38

## 2018-10-18 RX ADMIN — PROPOFOL 100 MCG/KG/MIN: 10 INJECTION, EMULSION INTRAVENOUS at 09:55

## 2018-10-18 RX ADMIN — PROPOFOL 20 MG: 10 INJECTION, EMULSION INTRAVENOUS at 11:17

## 2018-10-18 RX ADMIN — OXYCODONE HYDROCHLORIDE 10 MG: 5 TABLET ORAL at 13:55

## 2018-10-18 RX ADMIN — PHENYLEPHRINE HYDROCHLORIDE 50 MCG: 10 INJECTION, SOLUTION INTRAMUSCULAR; INTRAVENOUS; SUBCUTANEOUS at 10:26

## 2018-10-18 RX ADMIN — SODIUM CHLORIDE, POTASSIUM CHLORIDE, SODIUM LACTATE AND CALCIUM CHLORIDE: 600; 310; 30; 20 INJECTION, SOLUTION INTRAVENOUS at 11:33

## 2018-10-18 RX ADMIN — ONDANSETRON 4 MG: 2 INJECTION INTRAMUSCULAR; INTRAVENOUS at 10:25

## 2018-10-18 RX ADMIN — PHENYLEPHRINE HYDROCHLORIDE 100 MCG: 10 INJECTION, SOLUTION INTRAMUSCULAR; INTRAVENOUS; SUBCUTANEOUS at 10:52

## 2018-10-18 RX ADMIN — DEXAMETHASONE SODIUM PHOSPHATE 4 MG: 4 INJECTION, SOLUTION INTRA-ARTICULAR; INTRALESIONAL; INTRAMUSCULAR; INTRAVENOUS; SOFT TISSUE at 10:25

## 2018-10-18 RX ADMIN — OXYCODONE HYDROCHLORIDE 10 MG: 10 TABLET, FILM COATED, EXTENDED RELEASE ORAL at 09:07

## 2018-10-18 RX ADMIN — MIDAZOLAM 2 MG: 1 INJECTION INTRAMUSCULAR; INTRAVENOUS at 09:47

## 2018-10-18 RX ADMIN — PROPOFOL 15 MG: 10 INJECTION, EMULSION INTRAVENOUS at 11:33

## 2018-10-18 RX ADMIN — PROPOFOL 10 MG: 10 INJECTION, EMULSION INTRAVENOUS at 11:07

## 2018-10-18 RX ADMIN — GLYCOPYRROLATE 0.1 MG: 0.2 INJECTION, SOLUTION INTRAMUSCULAR; INTRAVENOUS at 09:47

## 2018-10-18 RX ADMIN — FENTANYL CITRATE 50 MCG: 50 INJECTION, SOLUTION INTRAMUSCULAR; INTRAVENOUS at 10:10

## 2018-10-18 RX ADMIN — FENTANYL CITRATE 50 MCG: 50 INJECTION, SOLUTION INTRAMUSCULAR; INTRAVENOUS at 12:19

## 2018-10-18 RX ADMIN — DEXMEDETOMIDINE HYDROCHLORIDE 12 MCG: 100 INJECTION, SOLUTION INTRAVENOUS at 09:57

## 2018-10-18 RX ADMIN — HYDROXYZINE HYDROCHLORIDE 25 MG: 25 TABLET ORAL at 09:07

## 2018-10-18 RX ADMIN — ACETAMINOPHEN 975 MG: 325 TABLET, FILM COATED ORAL at 09:07

## 2018-10-18 RX ADMIN — PROPOFOL 15 MG: 10 INJECTION, EMULSION INTRAVENOUS at 11:27

## 2018-10-18 RX ADMIN — PROPOFOL 15 MG: 10 INJECTION, EMULSION INTRAVENOUS at 11:22

## 2018-10-18 RX ADMIN — KETAMINE HYDROCHLORIDE 20 MG: 10 INJECTION, SOLUTION INTRAMUSCULAR; INTRAVENOUS at 09:55

## 2018-10-18 RX ADMIN — CLINDAMYCIN PHOSPHATE 900 MG: 18 INJECTION, SOLUTION INTRAVENOUS at 09:55

## 2018-10-18 RX ADMIN — FENTANYL CITRATE 50 MCG: 50 INJECTION, SOLUTION INTRAMUSCULAR; INTRAVENOUS at 12:11

## 2018-10-18 RX ADMIN — Medication 0.5 MG: at 12:40

## 2018-10-18 RX ADMIN — SODIUM CHLORIDE, POTASSIUM CHLORIDE, SODIUM LACTATE AND CALCIUM CHLORIDE: 600; 310; 30; 20 INJECTION, SOLUTION INTRAVENOUS at 09:44

## 2018-10-18 RX ADMIN — PROPOFOL 10 MG: 10 INJECTION, EMULSION INTRAVENOUS at 10:08

## 2018-10-18 RX ADMIN — DEXMEDETOMIDINE HYDROCHLORIDE 8 MCG: 100 INJECTION, SOLUTION INTRAVENOUS at 09:59

## 2018-10-18 RX ADMIN — FENTANYL CITRATE 50 MCG: 50 INJECTION, SOLUTION INTRAMUSCULAR; INTRAVENOUS at 11:47

## 2018-10-18 RX ADMIN — KETAMINE HYDROCHLORIDE 20 MG: 10 INJECTION, SOLUTION INTRAMUSCULAR; INTRAVENOUS at 11:07

## 2018-10-18 ASSESSMENT — ENCOUNTER SYMPTOMS
SEIZURES: 0
DYSRHYTHMIAS: 0

## 2018-10-18 ASSESSMENT — COPD QUESTIONNAIRES: COPD: 1

## 2018-10-18 ASSESSMENT — LIFESTYLE VARIABLES: TOBACCO_USE: 1

## 2018-10-18 NOTE — PROGRESS NOTES
Admission medication history interview status for the 10/18/2018  admission is complete. See EPIC admission navigator for prior to admission medications     Medication history source reliability:Good    Medication history interview source(s):Patient    Medication history resources (including written lists, pill bottles, clinic record):None    Primary pharmacy.[facility]    Additional medication history information not noted on PTA med list :None    Time spent in this activity: 90 minutes    Prior to Admission medications    Medication Sig Last Dose Taking? Auth Provider   Acetaminophen (TYLENOL PO) Take 500 mg by mouth 2 times daily as needed Max Acetaminophen dose : 4000mg in 24 hours. Along with Percocet 10/17/2018 at Afternoon Yes Reported, Patient   albuterol (2.5 MG/3ML) 0.083% neb solution Take 2.5 mg by nebulization every 6 hours as needed for shortness of breath / dyspnea or wheezing More than a Month at PRN Yes Reported, Patient   albuterol (PROAIR HFA/PROVENTIL HFA/VENTOLIN HFA) 108 (90 Base) MCG/ACT inhaler Inhale 2 puffs into the lungs every 4 hours as needed for shortness of breath / dyspnea or wheezing Past Week at PRN Yes Reported, Patient   ALPRAZolam (XANAX PO) Take 0.25-0.5 mg by mouth daily as needed for anxiety  10/16/2018 at PRN Yes Reported, Patient   Ascorbic Acid (VITAMIN C PO) Take 500 mg by mouth daily 10/17/2018 at AM Yes Reported, Patient   azelastine (ASTELIN) 0.1 % nasal spray Spray 1 spray into both nostrils 2 times daily 10/17/2018 at PM Yes Reported, Patient   Azithromycin (ZITHROMAX PO) Take 250 mg by mouth every other day (for chronic upper respiratory infections) 10/17/2018 at PM Yes Reported, Patient   Benzonatate (TESSALON PO) Take 200 mg by mouth 2 times daily  10/17/2018 at PM Yes Reported, Patient   bimatoprost (LUMIGAN) 0.01 % SOLN Place 1 drop into both eyes At Bedtime  10/17/2018 at HS Yes Reported, Patient   Bumetanide (BUMEX PO) Take 1 mg by mouth twice a week  10/16/2018 Yes  Reported, Patient   CALCIUM-VITAMIN D PO Take 1 tablet by mouth 3 times daily  10/17/2018 at 2000 Yes Reported, Patient   Cetirizine HCl (ZYRTEC PO) Take 10 mg by mouth daily 10/17/2018 at 1200 Yes Reported, Patient   Coenzyme Q10 (COQ-10 PO) Take 1 tablet by mouth daily 10/17/2018 at Afternoon Yes Reported, Patient   Cyanocobalamin (B-12 SL) Place 5,000 mcg under the tongue daily  10/17/2018 at Afternoon Yes Reported, Patient   cycloSPORINE (RESTASIS) 0.05 % ophthalmic emulsion Place 1 drop into both eyes 2 times daily  10/18/2018 at 0430 Yes Reported, Patient   DOCUSATE CALCIUM PO Take 300 mg by mouth At Bedtime (3 x 100 mg = 300 mg dose) 10/17/2018 at HS Yes Reported, Patient   DULoxetine HCl (CYMBALTA PO) Take 120 mg by mouth daily (2 x 60 mg = 120 mg dose) 10/17/2018 at Afternoon Yes Reported, Patient   estradiol (ESTRACE) 0.1 MG/GM cream Place 2 g vaginally daily as needed  10/15/2018 at PRN Yes Reported, Patient   eszopiclone (LUNESTA) 1 MG TABS Take 1 mg by mouth At Bedtime. 10/17/2018 at HS Yes Reported, Patient   ferrous sulfate (IRON) 325 (65 Fe) MG tablet Take 325 mg by mouth daily 10/17/2018 at AM Yes Reported, Patient   FLUCONAZOLE PO Take 200 mg by mouth daily as needed (When taking an antibiotic to prevent Thrush)  Not yet started at PRN Yes Reported, Patient   fluorometholone (FML LIQUIFILM) 0.1 % ophthalmic susp Place 1 drop into both eyes every other day  10/18/2018 at 0430 Yes Reported, Patient   fluticasone (FLONASE) 50 MCG/ACT nasal spray Spray 1 spray into both nostrils 2 times daily  10/18/2018 at 0430 Yes Reported, Patient   fluticasone-salmeterol (ADVAIR) 250-50 MCG/DOSE diskus inhaler Inhale 1 puff into the lungs 2 times daily 10/18/2018 at 0430 Yes Reported, Patient   glucosamine-chondroitinoitin 4216-3813 MG/30ML LIQD Take 15 mLs by mouth daily 10/17/2018 at Afternoon Yes Reported, Patient   guaiFENesin-dextromethorphan (ROBITUSSIN DM) 100-10 MG/5ML syrup Take 10 mLs by mouth 2 times  daily as needed for cough 10/17/2018 at PRN Yes Reported, Patient   hydrocortisone (ANUSOL-HC) 2.5 % cream Place rectally 2 times daily as needed  10/11/2018 at PRN Yes Reported, Patient   hydrOXYzine (VISTARIL) 25 MG capsule Take 1 capsule (25 mg) by mouth 3 times daily as needed for itching (spasm)  Patient taking differently: Take 25 mg by mouth 3 times daily as needed (Pain)  10/17/2018 at PRN Yes Abdon Barahona MD   hypromellose (ARTIFICIAL TEARS) 0.5 % SOLN ophthalmic solution Place 1 drop into both eyes 4 times daily as needed for dry eyes 10/17/2018 at PM Yes Reported, Patient   ipratropium - albuterol 0.5 mg/2.5 mg/3 mL (DUONEB) 0.5-2.5 (3) MG/3ML neb solution Take 1 vial by nebulization every 6 hours as needed for shortness of breath / dyspnea or wheezing More than a Month at PRN Yes Reported, Patient   ketoconazole (NIZORAL) 2 % cream Apply topically daily as needed for itching  More than a year at PRN Yes Reported, Patient   LamoTRIgine (LAMICTAL PO) Take 300 mg by mouth daily (Takes 2 x 150mg tablet = 300mg dose) 10/17/2018 at AM Yes Reported, Patient   Lansoprazole (PREVACID PO) Take 30 mg by mouth At Bedtime  10/17/2018 at PM Yes Reported, Patient   Levothyroxine Sodium (SYNTHROID PO) Take 125 mcg by mouth five times a week Take 1 tablet on Wednesday, Thursday, Friday, Saturday and [place] 10/18/2018 at 0430 Yes Reported, Patient   Levothyroxine Sodium (SYNTHROID PO) Take 187.5 mcg by mouth twice a week (1.5 x 125 mcg = 187.5 dose) on Monday and Tuesday 10/16/2018 at AM Yes Reported, Patient   lidocaine (ASPERCREME W/LIDOCAINE) 4 % CREA cream Apply topically 3 times daily as needed for mild pain 10/17/2018 at 1200 Yes Reported, Patient   METAXALONE PO Take 800 mg by mouth 2 times daily as needed 10/17/2018 at PRN Yes Reported, Patient   Metoclopramide HCl (REGLAN PO) Take 10 mg by mouth every 6 hours as needed More than a month at PRN Yes Reported, Patient   Montelukast Sodium (SINGULAIR PO)  Take 10 mg by mouth daily 10/17/2018 at 1200 Yes Reported, Patient   mupirocin (BACTROBAN) 2 % ointment Apply topically 3 times daily as needed  10/17/2018 at PM Yes Reported, Patient   Nitroglycerin (NITROSTAT SL) Place 0.4 mg under the tongue every 5 minutes as needed for other (sphincter of oddi spasms)  Past Week at PRN Yes Reported, Patient   nystatin (MYCOSTATIN) 093698 UNIT/ML suspension Take 500,000 Units by mouth 4 times daily as needed (Thrush)  10/11/2018 at PRN Yes Reported, Patient   nystatin (NYSTOP) 241726 UNIT/GM POWD Apply topically 2 times daily as needed Past Week at PRN Yes Reported, Patient   nystatin-triamcinolone (MYCOLOG II) cream Apply topically 2 times daily as needed  Past Month at PRN Yes Reported, Patient   Omega-3 Fatty Acids (FISH OIL PO) Take 2 capsules by mouth 4 times daily  10/4/2018 at AM Yes Reported, Patient   OXYCODONE HCL PO Take 2.5 mg by mouth At Bedtime (0.5 x 5 mg = 2.5 mg dose) 10/17/2018 at HS Yes Reported, Patient   OXYCODONE HCL PO Take 5 mg by mouth 4 times daily as needed And 1/2 tab at night. Max 3.5/day  10/18/2018 at 0530 Yes Reported, Patient   Pediatric Multivitamins-Iron (FLINTSTONES PLUS IRON PO) Take 2 tablets by mouth daily 10/17/2018 at AM Yes Reported, Patient   Polyethylene Glycol 3350 (MIRALAX PO) Take 17 g by mouth every other day  10/17/2018 at PM Yes Reported, Patient   Potassium Chloride Ailyn ER (K-DUR PO) Take 20 mEq by mouth twice a week IF TAKING BUMEX  10/16/2018 Yes Reported, Patient   pregabalin (LYRICA) 100 MG capsule Take 100 mg by mouth 3 times daily  10/18/2018 at 0430 Yes Reported, Patient   RaNITidine HCl (ZANTAC PO) Take 150 mg by mouth 2 times daily 10/18/2018 at 0430 Yes Reported, Patient   secukinumab (COSENTYX) 150 MG/ML prefilled syringe Inject 150 mg Subcutaneous every 14 days  10/11/2018 Yes Reported, Patient   traMADol (ULTRAM) 50 MG tablet Take 50 mg by mouth daily as needed (when driving)  10/16/2018 at PRN Yes Reported,  Patient   TRAZODONE HCL PO Take 50 mg by mouth At Bedtime  10/17/2018 at HS Yes Reported, Patient   VITAMIN D, CHOLECALCIFEROL, PO Take 5,000 Units by mouth daily 10/17/2018 at AM Yes Reported, Patient

## 2018-10-18 NOTE — ANESTHESIA PREPROCEDURE EVALUATION
Procedure: Procedure(s):  SPINAL CORD STIMULATOR IMPLANT (C-ARM)  Preop diagnosis: CHRONIC PAIN SYNDROME    Allergies   Allergen Reactions     Augmentin      Contrast Dye Hives     Erythromycin GI Disturbance     Neurontin [Gabapentin Enacarbil]      NEUROLOGIC SYMPTOMS     Tizanidine      APHASIC     Vioxx      EDEMA       Amoxicillin Rash     Past Medical History:   Diagnosis Date     Acquired hypothyroidism      Allergic rhinitis      Anemia      Arthritis      Chronic constipation      Chronic pain syndrome      COPD (chronic obstructive pulmonary disease) (H)      Depression with anxiety      DJD (degenerative joint disease)      Dysphagia      Fibromyalgia      GERD (gastroesophageal reflux disease)      Glaucoma      IBS (irritable bowel syndrome)      IgG deficiency (H)      Irregular heart beat     PVCs     Lymphedema      Memory loss      MMT (medial meniscus tear)     RIGHT     Morbid obesity (H)      DANIELITO on CPAP      Osteopenia      Other chronic pain      Pain medication agreement      Polyarthritis      Primary insomnia      Psoriasis      PVD (peripheral vascular disease) (H)      Recurrent pancreatitis (H)      Past Surgical History:   Procedure Laterality Date     C TOTAL KNEE ARTHROPLASTY Left 2013    DML     C TOTAL KNEE ARTHROPLASTY Right 07/15/2015    DML     CHOLECYSTECTOMY       COLONOSCOPY       ENT SURGERY      TONSILLECTOMY     GASTRIC BYPASS           GENITOURINARY SURGERY      CYSTOURETHROSCOPY     GI SURGERY      EGD, GASTROSTOMY TUBE/ERCP     GYN SURGERY      , D&C, HYSTEROSCOPY, POLYPECTOMY     HC KNEE SCOPE,SINGLE MENISECTOMY  12    Right, BOTH MEDIAL AND LATERAL MENISECTOMIES     PARTIAL SECOND RAY RESECTION LEFT FOOT       Prior to Admission medications    Medication Sig Start Date End Date Taking? Authorizing Provider   Acetaminophen (TYLENOL PO) Take 500 mg by mouth 2 times daily as needed Max Acetaminophen dose : 4000mg in 24 hours. Along with Percocet    Yes Reported, Patient   albuterol (2.5 MG/3ML) 0.083% neb solution Take 2.5 mg by nebulization every 6 hours as needed for shortness of breath / dyspnea or wheezing   Yes Reported, Patient   albuterol (PROAIR HFA/PROVENTIL HFA/VENTOLIN HFA) 108 (90 Base) MCG/ACT inhaler Inhale 2 puffs into the lungs every 4 hours as needed for shortness of breath / dyspnea or wheezing   Yes Reported, Patient   ALPRAZolam (XANAX PO) Take 0.25-0.5 mg by mouth daily as needed for anxiety    Yes Reported, Patient   Ascorbic Acid (VITAMIN C PO) Take 500 mg by mouth daily   Yes Reported, Patient   azelastine (ASTELIN) 0.1 % nasal spray Spray 1 spray into both nostrils 2 times daily   Yes Reported, Patient   Azithromycin (ZITHROMAX PO) Take 250 mg by mouth every other day (for chronic upper respiratory infections)   Yes Reported, Patient   Benzonatate (TESSALON PO) Take 200 mg by mouth 2 times daily    Yes Reported, Patient   bimatoprost (LUMIGAN) 0.01 % SOLN Place 1 drop into both eyes At Bedtime    Yes Reported, Patient   Bumetanide (BUMEX PO) Take 1 mg by mouth twice a week    Yes Reported, Patient   CALCIUM-VITAMIN D PO Take 1 tablet by mouth 3 times daily    Yes Reported, Patient   Cetirizine HCl (ZYRTEC PO) Take 10 mg by mouth daily   Yes Reported, Patient   Coenzyme Q10 (COQ-10 PO) Take 1 tablet by mouth daily   Yes Reported, Patient   Cyanocobalamin (B-12 SL) Place 5,000 mcg under the tongue daily    Yes Reported, Patient   cycloSPORINE (RESTASIS) 0.05 % ophthalmic emulsion Place 1 drop into both eyes 2 times daily    Yes Reported, Patient   DOCUSATE CALCIUM PO Take 300 mg by mouth At Bedtime (3 x 100 mg = 300 mg dose)   Yes Reported, Patient   DULoxetine HCl (CYMBALTA PO) Take 120 mg by mouth daily (2 x 60 mg = 120 mg dose)   Yes Reported, Patient   estradiol (ESTRACE) 0.1 MG/GM cream Place 2 g vaginally daily as needed    Yes Reported, Patient   eszopiclone (LUNESTA) 1 MG TABS Take 1 mg by mouth At Bedtime.   Yes Reported,  Patient   ferrous sulfate (IRON) 325 (65 Fe) MG tablet Take 325 mg by mouth daily   Yes Reported, Patient   FLUCONAZOLE PO Take 200 mg by mouth daily as needed (When taking an antibiotic to prevent Thrush)    Yes Reported, Patient   fluorometholone (FML LIQUIFILM) 0.1 % ophthalmic susp Place 1 drop into both eyes every other day    Yes Reported, Patient   fluticasone (FLONASE) 50 MCG/ACT nasal spray Spray 1 spray into both nostrils 2 times daily    Yes Reported, Patient   fluticasone-salmeterol (ADVAIR) 250-50 MCG/DOSE diskus inhaler Inhale 1 puff into the lungs 2 times daily   Yes Reported, Patient   glucosamine-chondroitinoitin 8778-1424 MG/30ML LIQD Take 15 mLs by mouth daily   Yes Reported, Patient   guaiFENesin-dextromethorphan (ROBITUSSIN DM) 100-10 MG/5ML syrup Take 10 mLs by mouth 2 times daily as needed for cough   Yes Reported, Patient   hydrocortisone (ANUSOL-HC) 2.5 % cream Place rectally 2 times daily as needed    Yes Reported, Patient   hydrOXYzine (VISTARIL) 25 MG capsule Take 1 capsule (25 mg) by mouth 3 times daily as needed for itching (spasm)  Patient taking differently: Take 25 mg by mouth 3 times daily as needed (Pain)  7/30/15  Yes Abdon Barahona MD   hypromellose (ARTIFICIAL TEARS) 0.5 % SOLN ophthalmic solution Place 1 drop into both eyes 4 times daily as needed for dry eyes   Yes Reported, Patient   ipratropium - albuterol 0.5 mg/2.5 mg/3 mL (DUONEB) 0.5-2.5 (3) MG/3ML neb solution Take 1 vial by nebulization every 6 hours as needed for shortness of breath / dyspnea or wheezing   Yes Reported, Patient   ketoconazole (NIZORAL) 2 % cream Apply topically daily as needed for itching    Yes Reported, Patient   LamoTRIgine (LAMICTAL PO) Take 300 mg by mouth daily (Takes 2 x 150mg tablet = 300mg dose)   Yes Reported, Patient   Lansoprazole (PREVACID PO) Take 30 mg by mouth At Bedtime    Yes Reported, Patient   Levothyroxine Sodium (SYNTHROID PO) Take 125 mcg by mouth five times a week Take 1  tablet on Wednesday, Thursday, Friday, Saturday and Sunday   Yes Reported, Patient   Levothyroxine Sodium (SYNTHROID PO) Take 187.5 mcg by mouth twice a week (1.5 x 125 mcg = 187.5 dose) on Monday and Tuesday   Yes Reported, Patient   lidocaine (ASPERCREME W/LIDOCAINE) 4 % CREA cream Apply topically 3 times daily as needed for mild pain   Yes Reported, Patient   METAXALONE PO Take 800 mg by mouth 2 times daily as needed   Yes Reported, Patient   Metoclopramide HCl (REGLAN PO) Take 10 mg by mouth every 6 hours as needed   Yes Reported, Patient   Montelukast Sodium (SINGULAIR PO) Take 10 mg by mouth daily   Yes Reported, Patient   mupirocin (BACTROBAN) 2 % ointment Apply topically 3 times daily as needed    Yes Reported, Patient   Nitroglycerin (NITROSTAT SL) Place 0.4 mg under the tongue every 5 minutes as needed for other (sphincter of oddi spasms)    Yes Reported, Patient   nystatin (MYCOSTATIN) 090975 UNIT/ML suspension Take 500,000 Units by mouth 4 times daily as needed (Thrush)    Yes Reported, Patient   nystatin (NYSTOP) 358221 UNIT/GM POWD Apply topically 2 times daily as needed   Yes Reported, Patient   nystatin-triamcinolone (MYCOLOG II) cream Apply topically 2 times daily as needed    Yes Reported, Patient   Omega-3 Fatty Acids (FISH OIL PO) Take 2 capsules by mouth 4 times daily    Yes Reported, Patient   OXYCODONE HCL PO Take 2.5 mg by mouth At Bedtime (0.5 x 5 mg = 2.5 mg dose)   Yes Reported, Patient   OXYCODONE HCL PO Take 5 mg by mouth 4 times daily as needed And 1/2 tab at night. Max 3.5/day    Yes Reported, Patient   Pediatric Multivitamins-Iron (FLINTSTONES PLUS IRON PO) Take 2 tablets by mouth daily   Yes Reported, Patient   Polyethylene Glycol 3350 (MIRALAX PO) Take 17 g by mouth every other day    Yes Reported, Patient   Potassium Chloride Ailyn ER (K-DUR PO) Take 20 mEq by mouth twice a week IF TAKING BUMEX    Yes Reported, Patient   pregabalin (LYRICA) 100 MG capsule Take 100 mg by mouth 3  times daily    Yes Reported, Patient   RaNITidine HCl (ZANTAC PO) Take 150 mg by mouth 2 times daily   Yes Reported, Patient   secukinumab (COSENTYX) 150 MG/ML prefilled syringe Inject 150 mg Subcutaneous every 14 days    Yes Reported, Patient   traMADol (ULTRAM) 50 MG tablet Take 50 mg by mouth daily as needed (when driving)    Yes Reported, Patient   TRAZODONE HCL PO Take 50 mg by mouth At Bedtime    Yes Reported, Patient   VITAMIN D, CHOLECALCIFEROL, PO Take 5,000 Units by mouth daily   Yes Reported, Patient     No current Epic-ordered facility-administered medications on file.      No current [facility]-ordered outpatient prescriptions on file.     Wt Readings from Last 1 Encounters:   09/06/18 102.5 kg (226 lb)     Temp Readings from Last 1 Encounters:   05/05/16 36.8  C (98.3  F) (Oral)     BP Readings from Last 6 Encounters:   09/06/18 129/86   05/24/17 140/74     Pulse Readings from Last 4 Encounters:   09/06/18 75   05/24/17 77   05/05/16 80     Resp Readings from Last 1 Encounters:   03/27/17 18     SpO2 Readings from Last 1 Encounters:   05/24/17 94%     RECENT LABS: Hgb 15.4, K 4.4, Glucose 101, Cr 0.91  ECG: NSR, no st or twave abnormalities.         Anesthesia Evaluation     .             ROS/MED HX    ENT/Pulmonary:     (+)sleep apnea, tobacco use, COPD, , . .    Neurologic:      (-) seizures, CVA and migraines   Cardiovascular:     (+) -Peripheral Vascular Disease---. : . . . :. Irregular Heartbeat/Palpitations, .      (-) hypertension, CAD, BRYSON, arrhythmias and dyslipidemia   METS/Exercise Tolerance:  >4 METS   Hematologic:     (+) Anemia, -      Musculoskeletal:   (+) arthritis, , , other musculoskeletal- osteoporosis, lymphedema      GI/Hepatic:     (+) GERD Other GI/Hepatic sphincter of oddi dysfunction causing recurrent pancreatitis     (-) liver disease   Renal/Genitourinary:      (-) renal disease and nephrolithiasis   Endo:     (+) thyroid problem Obesity, .   (-) Type I DM and Type II DM    Psychiatric:        (-) psychiatric history   Infectious Disease:        (-) Recent Fever   Malignancy:         Other: Comment: Fibromyalgia   (+) H/O Chronic Pain,H/O chronic opiod use ,                    Physical Exam  Normal systems: cardiovascular, pulmonary and dental    Airway   Mallampati: II  TM distance: >3 FB  Neck ROM: full    Dental     Cardiovascular   Rhythm and rate: regular and normal  (-) no murmur    Pulmonary    breath sounds clear to auscultation                    Anesthesia Plan      History & Physical Review      ASA Status:  3 .    NPO Status:  > 8 hours    Plan for MAC Reason for MAC:  Deep or markedly invasive procedure (G8)  PONV prophylaxis:  Ondansetron (or other 5HT-3)  Pre-op oxy, tylenol, hydroxyzine  Versed, ketamine (20 mg), glyco prior to ketamine        Postoperative Care  Postoperative pain management:  Multi-modal analgesia.      Consents  Anesthetic plan, risks, benefits and alternatives discussed with:  Patient..                          .

## 2018-10-18 NOTE — IP AVS SNAPSHOT
[facility]    [address]    [place] [address]    Phone:  [phone]    Fax:  [phone]                                       After Visit Summary   10/18/2018    Violeta Maria    MRN: [id number]           After Visit Summary Signature Page     I have received my discharge instructions, and my questions have been answered. I have discussed any challenges I see with this plan with the nurse or doctor.    ..........................................................................................................................................  Patient/Patient Representative Signature      ..........................................................................................................................................  Patient Representative Print Name and Relationship to Patient    ..................................................               ................................................  Date                                   Time    ..........................................................................................................................................  Reviewed by Signature/Title    ...................................................              ..............................................  Date                                               Time          22EPIC Rev 08/18

## 2018-10-18 NOTE — ANESTHESIA CARE TRANSFER NOTE
Patient: Violeta Maria    Procedure(s):  SPINAL CORD STIMULATOR IMPLANT    Diagnosis: CHRONIC PAIN SYNDROME  Diagnosis Additional Information: No value filed.    Anesthesia Type:   MAC     Note:  Airway :Nasal Cannula  Patient transferred to:PACU  Comments: Pt exhibits spont resps, all monitors and alarms on in pacu, report given to RN, vss.Handoff Report: Identifed the Patient, Identified the Reponsible Provider, Reviewed the pertinent medical history, Discussed the surgical course, Reviewed Intra-OP anesthesia mangement and issues during anesthesia, Set expectations for post-procedure period and Allowed opportunity for questions and acknowledgement of understanding      Vitals: (Last set prior to Anesthesia Care Transfer)    CRNA VITALS  10/18/2018 1127 - 10/18/2018 1202      10/18/2018             Resp Rate (set): 10                Electronically Signed By: DAPHNE Ruffin CRNA  October 18, 2018  12:02 PM

## 2018-10-18 NOTE — DISCHARGE INSTRUCTIONS
Same Day Surgery Discharge Instructions for  Sedation and General Anesthesia       It's not unusual to feel dizzy, light-headed or faint for up to 24 hours after surgery or while taking pain medication.  If you have these symptoms: sit for a few minutes before standing and have someone assist you when you get up to walk or use the bathroom.      You should rest and relax for the next 24 hours. We recommend you make arrangements to have an adult stay with you for at least 24 hours after your discharge.  Avoid hazardous and strenuous activity.      DO NOT DRIVE any vehicle or operate mechanical equipment for 24 hours following the end of your surgery.  Even though you may feel normal, your reactions may be affected by the medication you have received.      Do not drink alcoholic beverages for 24 hours following surgery.       Slowly progress to your regular diet as you feel able. It's not unusual to feel nauseated and/or vomit after receiving anesthesia.  If you develop these symptoms, drink clear liquids (apple juice, ginger ale, broth, 7-up, etc. ) until you feel better.  If your nausea and vomiting persists for 24 hours, please notify your surgeon.        All narcotic pain medications, along with inactivity and anesthesia, can cause constipation. Drinking plenty of liquids and increasing fiber intake will help.      For any questions of a medical nature, call your surgeon.      Do not make important decisions for 24 hours.      If you had general anesthesia, you may have a sore throat for a couple of days related to the breathing tube used during surgery.  You may use Cepacol lozenges to help with this discomfort.  If it worsens or if you develop a fever, contact your surgeon.       If you feel your pain is not well managed with the pain medications prescribed by your surgeon, please contact your surgeon's office to let them know so they can address your concerns.           Spinal Cord Stimulator Implant  Discharge Instruction      After Surgery:     Resume light activity as tolerated    Restrict bending, lifting and twisting to reduce the chance of your leads moving for 6-8 weeks    Resume your regular pain medication regimen    Begin taking your oral antibiotic the day after your surgery.  It is important you finish the entire course of medication until gone.    Driving is not recommended while your stimulator is turned on    A small amount of blood visible on the bandages is normal, if any leaks out of the bandage contact [facility]    Site Care:    DO NOT remove any bandages from the surgery.  A RN or your provider will remove the bandages at your follow-up appointment at [facility].    Your incisions have been closed with dissolvable stitches.  There are steri-strips covering your incision under your bandages.  Do not remove these, they will fall off on their own in 5-7 days.    After the bandages are removed, check your incision sites daily.  Keep sites clean and dry.    During the first few days, you may notice some swelling or discoloration around the incision sites, which is normal.  Some fluid (yellow to light red to orange) may ooze or leak from the incisions. However, if the fluid is foul smelling, thick, or does not decrease in amount, call [facility].    DO NOT shower until your bandages have been removed.    NO soaking your incisions for 2 weeks.  This includes baths, whirlpools, swimming pools, and hot tubs.    Call [facility] during business hours or the on-call call provider after hours if you develop any of the following:    Signs of infection such as: fevers, chills, increased pain, drainage (as mentioned above), redness, and swelling from your incision sites.    Headache persisting for more than 48 hours    Unusual changes in or stopping of sensation in your legs or back.     Uncontrolled pain.     Signs or symptoms of a deep vein thrombosis  (DVT) : swelling in one or both legs, pain or tenderness in one or both legs, warm skin on your leg, and/or red or discolored skin on your leg    [facility]  [phone]    After hours on-call provider     Emergency Situations go to the Emergency Department or Call 911:    Sudden severe back pain    Sudden onset of leg weakness and spasms    Loss of bladder control and/or bowel function    Signs or symptoms or a pulmonary embolism (PE): sudden coughing --which may bring up blood, sharp chest pain, rapid breathing or shortness of breath, and/or severe light headedness             **If you have questions or concerns about your procedure,  call Dr. Serra at [phone]**

## 2018-10-18 NOTE — IP AVS SNAPSHOT
MRN:[id number]                      After Visit Summary   10/18/2018    Violeta Maria    MRN: [id number]           Thank you!     Thank you for choosing [facility] for your care. Our goal is always to provide you with excellent care. Hearing back from our patients is one way we can continue to improve our services. Please take a few minutes to complete the written survey that you may receive in the mail after you visit with us. Thank you!        Patient Information     Date Of Birth          1955        About your hospital stay     You were admitted on:  October 18, 2018 You last received care in the:  [facility] Same Day Surgery    You were discharged on:  October 18, 2018       Who to Call     For medical emergencies, please call 911.  For non-urgent questions about your medical care, please call your primary care provider or clinic, [phone]  For questions related to your surgery, please call your surgery clinic        Attending Provider     Provider Caty Nolasco, DO Physical Medicine and Rehab       Primary Care Provider Office Phone # Fax #    Abdon Eitan Barahona [phone]      Further instructions from your care team         Same Day Surgery Discharge Instructions for  Sedation and General Anesthesia       It's not unusual to feel dizzy, light-headed or faint for up to 24 hours after surgery or while taking pain medication.  If you have these symptoms: sit for a few minutes before standing and have someone assist you when you get up to walk or use the bathroom.      You should rest and relax for the next 24 hours. We recommend you make arrangements to have an adult stay with you for at least 24 hours after your discharge.  Avoid hazardous and strenuous activity.      DO NOT DRIVE any vehicle or operate mechanical equipment for 24 hours following the end of your surgery.  Even though you may feel normal, your reactions may be affected by  the medication you have received.      Do not drink alcoholic beverages for 24 hours following surgery.       Slowly progress to your regular diet as you feel able. It's not unusual to feel nauseated and/or vomit after receiving anesthesia.  If you develop these symptoms, drink clear liquids (apple juice, ginger ale, broth, 7-up, etc. ) until you feel better.  If your nausea and vomiting persists for 24 hours, please notify your surgeon.        All narcotic pain medications, along with inactivity and anesthesia, can cause constipation. Drinking plenty of liquids and increasing fiber intake will help.      For any questions of a medical nature, call your surgeon.      Do not make important decisions for 24 hours.      If you had general anesthesia, you may have a sore throat for a couple of days related to the breathing tube used during surgery.  You may use Cepacol lozenges to help with this discomfort.  If it worsens or if you develop a fever, contact your surgeon.       If you feel your pain is not well managed with the pain medications prescribed by your surgeon, please contact your surgeon's office to let them know so they can address your concerns.           Spinal Cord Stimulator Implant Discharge Instruction      After Surgery:     Resume light activity as tolerated    Restrict bending, lifting and twisting to reduce the chance of your leads moving for 6-8 weeks    Resume your regular pain medication regimen    Begin taking your oral antibiotic the day after your surgery.  It is important you finish the entire course of medication until gone.    Driving is not recommended while your stimulator is turned on    A small amount of blood visible on the bandages is normal, if any leaks out of the bandage contact [facility]    Site Care:    DO NOT remove any bandages from the surgery.  A RN or your provider will remove the bandages at your follow-up appointment at [facility].    Your  incisions have been closed with dissolvable stitches.  There are steri-strips covering your incision under your bandages.  Do not remove these, they will fall off on their own in 5-7 days.    After the bandages are removed, check your incision sites daily.  Keep sites clean and dry.    During the first few days, you may notice some swelling or discoloration around the incision sites, which is normal.  Some fluid (yellow to light red to orange) may ooze or leak from the incisions. However, if the fluid is foul smelling, thick, or does not decrease in amount, call [facility].    DO NOT shower until your bandages have been removed.    NO soaking your incisions for 2 weeks.  This includes baths, whirlpools, swimming pools, and hot tubs.    Call [facility] during business hours or the on-call call provider after hours if you develop any of the following:    Signs of infection such as: fevers, chills, increased pain, drainage (as mentioned above), redness, and swelling from your incision sites.    Headache persisting for more than 48 hours    Unusual changes in or stopping of sensation in your legs or back.     Uncontrolled pain.     Signs or symptoms of a deep vein thrombosis (DVT) : swelling in one or both legs, pain or tenderness in one or both legs, warm skin on your leg, and/or red or discolored skin on your leg    [facility]  [phone]    After hours on-call provider     Emergency Situations go to the Emergency Department or Call 911:    Sudden severe back pain    Sudden onset of leg weakness and spasms    Loss of bladder control and/or bowel function    Signs or symptoms or a pulmonary embolism (PE): sudden coughing --which may bring up blood, sharp chest pain, rapid breathing or shortness of breath, and/or severe light headedness             **If you have questions or concerns about your procedure,  call Dr. Serra at [phone]**          Pending Results      "Date and Time Order Name Status Description    10/18/2018 1155 XR Surgery NEELAM L/T 5 Min Fluoro w Stills In process             Admission Information     Date & Time Provider Department Dept. Phone    10/18/2018 Caty Serra DO [facility] Same Day Surgery [phone]      Your Vitals Were     Blood Pressure Pulse Temperature Respirations Height Weight    120/88 70 98  F (36.7  C) (Temporal) 12 1.626 m (5' 4\") 101.4 kg (223 lb 8 oz)    Last Period Pulse Oximetry BMI (Body Mass Index)             04/17/2010 94% 38.36 kg/m2         MyChart Information     [facility] gives you secure access to your electronic health record. If you see a primary care provider, you can also send messages to your care team and make appointments. If you have questions, please call your primary care clinic.  If you do not have a primary care provider, please call [phone] and they will assist you.        Care EveryWhere ID     This is your Care EveryWhere ID. This could be used by other organizations to access your [facility] medical records  [id number]        Equal Access to Services     ALFONSO PAZ : Hadmarshall Patrick, nelda erickson, glenn weston. So [facility] [phone].    ATENCIÓN: Si habla español, tiene a ewing disposición servicios gratuitos de asistencia lingüística. Rosalie al [phone].    We comply with applicable federal civil rights laws and [place] laws. We do not discriminate on the basis of race, color, national origin, age, disability, sex, sexual orientation, or gender identity.               Review of your medicines      UNREVIEWED medicines. Ask your doctor about these medicines        Dose / Directions    * albuterol (2.5 MG/3ML) 0.083% neb solution        Dose:  2.5 mg   Take 2.5 mg by nebulization every 6 hours as needed for shortness of breath / dyspnea or wheezing   Refills:  0       * albuterol 108 (90 Base) MCG/ACT inhaler "   Commonly known as:  PROAIR HFA/PROVENTIL HFA/VENTOLIN HFA        Dose:  2 puff   Inhale 2 puffs into the lungs every 4 hours as needed for shortness of breath / dyspnea or wheezing   Refills:  0       ASPERCREME W/LIDOCAINE 4 % Crea cream   Generic drug:  lidocaine        Apply topically 3 times daily as needed for mild pain   Refills:  0       azelastine 0.1 % nasal spray   Commonly known as:  ASTELIN        Dose:  1 spray   Spray 1 spray into both nostrils 2 times daily   Refills:  0       B-12 SL        Dose:  5000 mcg   Place 5,000 mcg under the tongue daily   Refills:  0       bimatoprost 0.01 % Soln   Commonly known as:  LUMIGAN        Dose:  1 drop   Place 1 drop into both eyes At Bedtime   Refills:  0       BUMEX PO        Dose:  1 mg   Take 1 mg by mouth twice a week   Refills:  0       CALCIUM-VITAMIN D PO        Dose:  1 tablet   Take 1 tablet by mouth 3 times daily   Refills:  0       COQ-10 PO        Dose:  1 tablet   Take 1 tablet by mouth daily   Refills:  0       COSENTYX 150 MG/ML prefilled syringe   Generic drug:  secukinumab        Dose:  150 mg   Inject 150 mg Subcutaneous every 14 days   Refills:  0       CYMBALTA PO        Dose:  120 mg   Take 120 mg by mouth daily (2 x 60 mg = 120 mg dose)   Refills:  0       DOCUSATE CALCIUM PO        Dose:  300 mg   Take 300 mg by mouth At Bedtime (3 x 100 mg = 300 mg dose)   Refills:  0       estradiol 0.1 MG/GM cream   Commonly known as:  ESTRACE        Dose:  2 g   Place 2 g vaginally daily as needed   Refills:  0       ferrous sulfate 325 (65 Fe) MG tablet   Commonly known as:  IRON        Dose:  325 mg   Take 325 mg by mouth daily   Refills:  0       FISH OIL PO        Dose:  2 capsule   Take 2 capsules by mouth 4 times daily   Refills:  0       FLINTSTONES PLUS IRON PO        Dose:  2 tablet   Take 2 tablets by mouth daily   Refills:  0       FLONASE 50 MCG/ACT spray   Generic drug:  fluticasone        Dose:  1 spray   Spray 1 spray into both  nostrils 2 times daily   Refills:  0       FLUCONAZOLE PO        Dose:  200 mg   Take 200 mg by mouth daily as needed (When taking an antibiotic to prevent Thrush)   Refills:  0       fluticasone-salmeterol 250-50 MCG/DOSE diskus inhaler   Commonly known as:  ADVAIR        Dose:  1 puff   Inhale 1 puff into the lungs 2 times daily   Refills:  0       FML LIQUIFILM 0.1 % ophthalmic susp   Generic drug:  fluorometholone        Dose:  1 drop   Place 1 drop into both eyes every other day   Refills:  0       glucosamine-chondroitinoitin 7088-9347 MG/30ML Liqd        Dose:  15 mL   Take 15 mLs by mouth daily   Refills:  0       guaiFENesin-dextromethorphan 100-10 MG/5ML syrup   Commonly known as:  ROBITUSSIN DM        Dose:  10 mL   Take 10 mLs by mouth 2 times daily as needed for cough   Refills:  0       hydrocortisone 2.5 % cream   Commonly known as:  ANUSOL-HC        Place rectally 2 times daily as needed   Refills:  0       hydrOXYzine 25 MG capsule   Commonly known as:  VISTARIL   Used for:  History of total knee replacement, right        Dose:  25 mg   Take 1 capsule (25 mg) by mouth 3 times daily as needed for itching (spasm)   Quantity:  30 capsule   Refills:  1       hypromellose 0.5 % Soln ophthalmic solution   Commonly known as:  ARTIFICIAL TEARS        Dose:  1 drop   Place 1 drop into both eyes 4 times daily as needed for dry eyes   Refills:  0       ipratropium - albuterol 0.5 mg/2.5 mg/3 mL 0.5-2.5 (3) MG/3ML neb solution   Commonly known as:  DUONEB        Dose:  1 vial   Take 1 vial by nebulization every 6 hours as needed for shortness of breath / dyspnea or wheezing   Refills:  0       K-DUR PO        Dose:  20 mEq   Take 20 mEq by mouth twice a week IF TAKING BUMEX   Refills:  0       ketoconazole 2 % cream   Commonly known as:  NIZORAL        Apply topically daily as needed for itching   Refills:  0       LAMICTAL PO        Dose:  300 mg   Take 300 mg by mouth daily (Takes 2 x 150mg tablet = 300mg  dose)   Refills:  0       LUNESTA 1 MG Tabs tablet   Generic drug:  eszopiclone        Dose:  1 mg   Take 1 mg by mouth At Bedtime.   Refills:  0       LYRICA 100 MG capsule   Generic drug:  pregabalin        Dose:  100 mg   Take 100 mg by mouth 3 times daily   Refills:  0       METAXALONE PO        Dose:  800 mg   Take 800 mg by mouth 2 times daily as needed   Refills:  0       MIRALAX PO        Dose:  17 g   Take 17 g by mouth every other day   Refills:  0       mupirocin 2 % ointment   Commonly known as:  BACTROBAN        Apply topically 3 times daily as needed   Refills:  0       NITROSTAT SL        Dose:  0.4 mg   Place 0.4 mg under the tongue every 5 minutes as needed for other (sphincter of oddi spasms)   Refills:  0       nystatin 447891 UNIT/ML suspension   Commonly known as:  MYCOSTATIN        Dose:  543172 Units   Take 500,000 Units by mouth 4 times daily as needed (Thrush)   Refills:  0       nystatin-triamcinolone cream   Commonly known as:  MYCOLOG II        Apply topically 2 times daily as needed   Refills:  0       NYSTOP 399546 UNIT/GM Powd   Generic drug:  nystatin        Apply topically 2 times daily as needed   Refills:  0       * OXYCODONE HCL PO        Dose:  5 mg   Take 5 mg by mouth 4 times daily as needed And 1/2 tab at night. Max 3.5/day   Refills:  0       * OXYCODONE HCL PO        Dose:  2.5 mg   Take 2.5 mg by mouth At Bedtime (0.5 x 5 mg = 2.5 mg dose)   Refills:  0       PREVACID PO        Dose:  30 mg   Take 30 mg by mouth At Bedtime   Refills:  0       REGLAN PO        Dose:  10 mg   Take 10 mg by mouth every 6 hours as needed   Refills:  0       RESTASIS 0.05 % ophthalmic emulsion   Generic drug:  cycloSPORINE        Dose:  1 drop   Place 1 drop into both eyes 2 times daily   Refills:  0       SINGULAIR PO        Dose:  10 mg   Take 10 mg by mouth daily   Refills:  0       * SYNTHROID PO        Dose:  125 mcg   Take 125 mcg by mouth five times a week Take 1 tablet on Wednesday,  Thursday, Friday, Saturday and Sunday   Refills:  0       * SYNTHROID PO        Dose:  187.5 mcg   Take 187.5 mcg by mouth twice a week (1.5 x 125 mcg = 187.5 dose) on Monday and Tuesday   Refills:  0       TESSALON PO        Dose:  200 mg   Take 200 mg by mouth 2 times daily   Refills:  0       TRAZODONE HCL PO        Dose:  50 mg   Take 50 mg by mouth At Bedtime   Refills:  0       TYLENOL PO        Dose:  500 mg   Take 500 mg by mouth 2 times daily as needed Max Acetaminophen dose : 4000mg in 24 hours. Along with Percocet   Refills:  0       ULTRAM 50 MG tablet   Generic drug:  traMADol        Dose:  50 mg   Take 50 mg by mouth daily as needed (when driving)   Refills:  0       VITAMIN C PO        Dose:  500 mg   Take 500 mg by mouth daily   Refills:  0       VITAMIN D (CHOLECALCIFEROL) PO        Dose:  5000 Units   Take 5,000 Units by mouth daily   Refills:  0       XANAX PO        Dose:  0.25-0.5 mg   Take 0.25-0.5 mg by mouth daily as needed for anxiety   Refills:  0       ZANTAC PO        Dose:  150 mg   Take 150 mg by mouth 2 times daily   Refills:  0       ZITHROMAX PO        Dose:  250 mg   Take 250 mg by mouth every other day (for chronic upper respiratory infections)   Refills:  0       ZYRTEC PO        Dose:  10 mg   Take 10 mg by mouth daily   Refills:  0       * Notice:  This list has 6 medication(s) that are the same as other medications prescribed for you. Read the directions carefully, and ask your doctor or other care provider to review them with you.             Protect others around you: Learn how to safely use, store and throw away your medicines at www.disposemymeds.org.        ANTIBIOTIC INSTRUCTION     You've Been Prescribed an Antibiotic - Now What?  Your healthcare team thinks that you or your loved one might have an infection. Some infections can be treated with antibiotics, which are powerful, life-saving drugs. Like all medications, antibiotics have side effects and should only be used  when necessary. There are some important things you should know about your antibiotic treatment.      Your healthcare team may run tests before you start taking an antibiotic.    Your team may take samples (e.g., from your blood, urine or other areas) to run tests to look for bacteria. These test can be important to determine if you need an antibiotic at all and, if you do, which antibiotic will work best.      Within a few days, your healthcare team might change or even stop your antibiotic.    Your team may start you on an antibiotic while they are working to find out what is making you sick.    Your team might change your antibiotic because test results show that a different antibiotic would be better to treat your infection.    In some cases, once your team has more information, they learn that you do not need an antibiotic at all. They may find out that you don't have an infection, or that the antibiotic you're taking won't work against your infection. For example, an infection caused by a virus can't be treated with antibiotics. Staying on an antibiotic when you don't need it is more likely to be harmful than helpful.      You may experience side effects from your antibiotic.    Like all medications, antibiotics have side effects. Some of these can be serious.    Let you healthcare team know if you have any known allergies when you are admitted to the hospital.    One significant side effect of nearly all antibiotics is the risk of severe and sometimes deadly diarrhea caused by Clostridium difficile (C. Difficile). This occurs when a person takes antibiotics because some good germs are destroyed. Antibiotic use allows C. diificile to take over, putting patients at high risk for this serious infection.    As a patient or caregiver, it is important to understand your or your loved one's antibiotic treatment. It is especially important for caregivers to speak up when patients can't speak for themselves. Here are  some important questions to ask your healthcare team.    What infection is this antibiotic treating and how do you know I have that infection?    What side effects might occur from this antibiotic?    How long will I need to take this antibiotic?    Is it safe to take this antibiotic with other medications or supplements (e.g., vitamins) that I am taking?     Are there any special directions I need to know about taking this antibiotic? For example, should I take it with food?    How will I be monitored to know whether my infection is responding to the antibiotic?    What tests may help to make sure the right antibiotic is prescribed for me?      Information provided by:  www.cdc.gov/getsmart  U.S. Department of Health and Human Services  Centers for disease Control and Prevention  National Center for Emerging and Zoonotic Infectious Diseases  Division of Healthcare Quality Promotion        Information about OPIOIDS     PRESCRIPTION OPIOIDS: WHAT YOU NEED TO KNOW   We gave you an opioid (narcotic) pain medicine. It is important to manage your pain, but opioids are not always the best choice. You should first try all the other options your care team gave you. Take this medicine for as short a time (and as few doses) as possible.    Some activities can increase your pain, such as bandage changes or therapy sessions. It may help to take your pain medicine 30 to 60 minutes before these activities. Reduce your stress by getting enough sleep, working on hobbies you enjoy and practicing relaxation or meditation. Talk to your care team about ways to manage your pain beyond prescription opioids.    These medicines have risks:    DO NOT drive when on new or higher doses of pain medicine. These medicines can affect your alertness and reaction times, and you could be arrested for driving under the influence (DUI). If you need to use opioids long-term, talk to your care team about driving.    DO NOT operate heavy machinery    DO  NOT do any other dangerous activities while taking these medicines.    DO NOT drink any alcohol while taking these medicines.     If the opioid prescribed includes acetaminophen, DO NOT take with any other medicines that contain acetaminophen. Read all labels carefully. Look for the word  acetaminophen  or  Tylenol.  Ask your pharmacist if you have questions or are unsure.    You can get addicted to pain medicines, especially if you have a history of addiction (chemical, alcohol or substance dependence). Talk to your care team about ways to reduce this risk.    All opioids tend to cause constipation. Drink plenty of water and eat foods that have a lot of fiber, such as fruits, vegetables, prune juice, apple juice and high-fiber cereal. Take a laxative (Miralax, milk of magnesia, Colace, Senna) if you don t move your bowels at least every other day. Other side effects include upset stomach, sleepiness, dizziness, throwing up, tolerance (needing more of the medicine to have the same effect), physical dependence and slowed breathing.    Store your pills in a secure place, locked if possible. We will not replace any lost or stolen medicine. If you don t finish your medicine, please throw away (dispose) as directed by your pharmacist. The [place] Pollution Control Agency has more information about safe disposal: https://www.pca.[facility].[place].us/living-green/managing-unwanted-medications             Medication List: This is a list of all your medications and when to take them. Check marks below indicate your daily home schedule. Keep this list as a reference.      Medications           Morning Afternoon Evening Bedtime As Needed    * albuterol (2.5 MG/3ML) 0.083% neb solution   Take 2.5 mg by nebulization every 6 hours as needed for shortness of breath / dyspnea or wheezing                                * albuterol 108 (90 Base) MCG/ACT inhaler   Commonly known as:  PROAIR HFA/PROVENTIL HFA/VENTOLIN HFA   Inhale 2 puffs  into the lungs every 4 hours as needed for shortness of breath / dyspnea or wheezing                                ASPERCREME W/LIDOCAINE 4 % Crea cream   Apply topically 3 times daily as needed for mild pain   Generic drug:  lidocaine                                azelastine 0.1 % nasal spray   Commonly known as:  ASTELIN   Spray 1 spray into both nostrils 2 times daily                                B-12 SL   Place 5,000 mcg under the tongue daily                                bimatoprost 0.01 % Soln   Commonly known as:  LUMIGAN   Place 1 drop into both eyes At Bedtime                                BUMEX PO   Take 1 mg by mouth twice a week                                CALCIUM-VITAMIN D PO   Take 1 tablet by mouth 3 times daily                                COQ-10 PO   Take 1 tablet by mouth daily                                COSENTYX 150 MG/ML prefilled syringe   Inject 150 mg Subcutaneous every 14 days   Generic drug:  secukinumab                                CYMBALTA PO   Take 120 mg by mouth daily (2 x 60 mg = 120 mg dose)                                DOCUSATE CALCIUM PO   Take 300 mg by mouth At Bedtime (3 x 100 mg = 300 mg dose)                                estradiol 0.1 MG/GM cream   Commonly known as:  ESTRACE   Place 2 g vaginally daily as needed                                ferrous sulfate 325 (65 Fe) MG tablet   Commonly known as:  IRON   Take 325 mg by mouth daily                                FISH OIL PO   Take 2 capsules by mouth 4 times daily                                FLINTSTONES PLUS IRON PO   Take 2 tablets by mouth daily                                FLONASE 50 MCG/ACT spray   Spray 1 spray into both nostrils 2 times daily   Generic drug:  fluticasone                                FLUCONAZOLE PO   Take 200 mg by mouth daily as needed (When taking an antibiotic to prevent Thrush)                                fluticasone-salmeterol 250-50 MCG/DOSE diskus inhaler    Commonly known as:  ADVAIR   Inhale 1 puff into the lungs 2 times daily                                FML LIQUIFILM 0.1 % ophthalmic susp   Place 1 drop into both eyes every other day   Generic drug:  fluorometholone                                glucosamine-chondroitinoitin 6172-3121 MG/30ML Liqd   Take 15 mLs by mouth daily                                guaiFENesin-dextromethorphan 100-10 MG/5ML syrup   Commonly known as:  ROBITUSSIN DM   Take 10 mLs by mouth 2 times daily as needed for cough                                hydrocortisone 2.5 % cream   Commonly known as:  ANUSOL-HC   Place rectally 2 times daily as needed                                hydrOXYzine 25 MG capsule   Commonly known as:  VISTARIL   Take 1 capsule (25 mg) by mouth 3 times daily as needed for itching (spasm)                                hypromellose 0.5 % Soln ophthalmic solution   Commonly known as:  ARTIFICIAL TEARS   Place 1 drop into both eyes 4 times daily as needed for dry eyes                                ipratropium - albuterol 0.5 mg/2.5 mg/3 mL 0.5-2.5 (3) MG/3ML neb solution   Commonly known as:  DUONEB   Take 1 vial by nebulization every 6 hours as needed for shortness of breath / dyspnea or wheezing                                K-DUR PO   Take 20 mEq by mouth twice a week IF TAKING BUMEX                                ketoconazole 2 % cream   Commonly known as:  NIZORAL   Apply topically daily as needed for itching                                LAMICTAL PO   Take 300 mg by mouth daily (Takes 2 x 150mg tablet = 300mg dose)                                LUNESTA 1 MG Tabs tablet   Take 1 mg by mouth At Bedtime.   Generic drug:  eszopiclone                                LYRICA 100 MG capsule   Take 100 mg by mouth 3 times daily   Generic drug:  pregabalin                                METAXALONE PO   Take 800 mg by mouth 2 times daily as needed                                MIRALAX PO   Take 17 g by mouth every  other day                                mupirocin 2 % ointment   Commonly known as:  BACTROBAN   Apply topically 3 times daily as needed                                NITROSTAT SL   Place 0.4 mg under the tongue every 5 minutes as needed for other (sphincter of oddi spasms)                                nystatin [id number] UNIT/ML suspension   Commonly known as:  MYCOSTATIN   Take 500,000 Units by mouth 4 times daily as needed (Thrush)                                nystatin-triamcinolone cream   Commonly known as:  MYCOLOG II   Apply topically 2 times daily as needed                                NYSTOP [id number] UNIT/GM Powd   Apply topically 2 times daily as needed   Generic drug:  nystatin                                * OXYCODONE HCL PO   Take 5 mg by mouth 4 times daily as needed And 1/2 tab at night. Max 3.5/day                                * OXYCODONE HCL PO   Take 2.5 mg by mouth At Bedtime (0.5 x 5 mg = 2.5 mg dose)                                PREVACID PO   Take 30 mg by mouth At Bedtime                                REGLAN PO   Take 10 mg by mouth every 6 hours as needed                                RESTASIS 0.05 % ophthalmic emulsion   Place 1 drop into both eyes 2 times daily   Generic drug:  cycloSPORINE                                SINGULAIR PO   Take 10 mg by mouth daily                                * SYNTHROID PO   Take 125 mcg by mouth five times a week Take 1 tablet on Wednesday, Thursday, Friday, Saturday and Sunday                                * SYNTHROID PO   Take 187.5 mcg by mouth twice a week (1.5 x 125 mcg = 187.5 dose) on Monday and Tuesday                                TESSALON PO   Take 200 mg by mouth 2 times daily                                TRAZODONE HCL PO   Take 50 mg by mouth At Bedtime                                TYLENOL PO   Take 500 mg by mouth 2 times daily as needed Max Acetaminophen dose : 4000mg in 24 hours. Along with Percocet   Last time this was given:   975 mg on 10/18/2018  9:07 AM                                ULTRAM 50 MG tablet   Take 50 mg by mouth daily as needed (when driving)   Generic drug:  traMADol                                VITAMIN C PO   Take 500 mg by mouth daily                                VITAMIN D (CHOLECALCIFEROL) PO   Take 5,000 Units by mouth daily                                XANAX PO   Take 0.25-0.5 mg by mouth daily as needed for anxiety                                ZANTAC PO   Take 150 mg by mouth 2 times daily                                ZITHROMAX PO   Take 250 mg by mouth every other day (for chronic upper respiratory infections)                                ZYRTEC PO   Take 10 mg by mouth daily                                * Notice:  This list has 6 medication(s) that are the same as other medications prescribed for you. Read the directions carefully, and ask your doctor or other care provider to review them with you.

## 2018-10-18 NOTE — ANESTHESIA POSTPROCEDURE EVALUATION
Patient: Violeta Maria    Procedure(s):  SPINAL CORD STIMULATOR IMPLANT    Diagnosis:CHRONIC PAIN SYNDROME  Diagnosis Additional Information: No value filed.    Anesthesia Type:  MAC    Note:  Anesthesia Post Evaluation    Patient location during evaluation: Phase 2  Patient participation: Able to fully participate in evaluation  Level of consciousness: awake and alert  Pain management: adequate  Airway patency: patent  Cardiovascular status: acceptable  Respiratory status: acceptable  Hydration status: acceptable  PONV: none     Anesthetic complications: None          Last vitals:  Vitals:    10/18/18 1219 10/18/18 1245 10/18/18 1300   BP:  120/88 (!) 127/94   Pulse:      Resp:  12 14   Temp:  36.7  C (98  F)    SpO2: 98% 94% 94%         Electronically Signed By: Shivani Suero MD  October 18, 2018  1:41 PM

## 2018-10-25 NOTE — PROCEDURES
Name:    Violeta Maria   :    1955  Location:   [facility]  Procedure date: 10/18/2018 09:40 AM  Procedure:    Nevro Lumbar  Spinal Cord Stimulator Implant  Indication:  G89.4    Allergies:  Ingredient Reaction Comment   POTASSIUM     GABAPENTIN     DYE     ROFECOXIB     TIZANIDINE     AMOXICILLIN     ERYTHROMYCIN BASE       Anesthesia: MAC and Local.    Implants: All implants were Nevro products. The leads (2) were serial number [id number]. The Senza implantable pulse generator was serial number [id number].    Description of Procedure:  After discussing potential risks and benefits, the patient did wish to proceed and signed a written consent form. The pocket site was marked in the preoperative area. An antibiotic prescription was given to the patient to use for the next 10 days for prophylaxis. The patient was brought into the operating room and positioned prone on the operating table taking care relieve all pressure points and place extremities in a comfortable position. MAC anesthesia was induced. The operative field was prepped and draped in normal sterile fashion. The skin was anesthetized with Xylocaine at the appropriate level as identified fluoroscopically.  A paramedian incision was then made in the mid back. A 14 gauge Tuohy needle was then introduced under fluoroscopy. The  T11-T12 epidural space was entered as a loss of resistance was felt with an air filled syringe. The Eight contact compact spinal cord stimulator lead was advanced to the superior border of the T8 vertebral level. This lead was Left of midline.  A second Eight contact compact spinal cord stimulator lead was placed through the T11-T12 epidural space and advanced to the superior border of the T9 vertebral level. This lead was right of midline. Lateral and AP fluoroscopy views were obtained and confirmed appropriate posterior epidural placement.      The anchors were placed and tightened around the leads using and  then to the fascia using 2.0 silk sutures. A superficial gluteal pocket was developed on the right side. A subcutaneous tunnel was made and the cables were pulled through to the gluteal pocket. The cable ends were connected to the IPG and all connections were secured to torque. An impedance check was performed and all impedances were within normal limits. The IPG and excess cable were then placed into the gluteal pocket with the excess cable coiled behind the IPG. The wounds were then irrigated profusely with saline solution. The incisions were closed in layers using absorbable sutures. Steri-strips were used as well as sutures. Final X-ray revealed no significant changes in lead positions.      The patient was awakened, positioned supine and delivered to the recovery  room. Discharge post-op care instructions were reviewed with the patient, and the patient verbalized understanding and was provided with a written copy. IV was discontinued with catheter intact and patient was then discharged.

## 2019-02-20 NOTE — PROGRESS NOTES
"SUBJECTIVE:  Violeta Maria is here in follow up of calcific rotator cuff tendinitis and possible small rotator cuff tear of the right shoulder.   1. Rotator cuff tendinitis, left shoulder  2. Suspected rotator cuff tear, left shoulder  3. Calcific rotator cuff tendinitis, right shoulder  4. Possible small rotator cuff tear, right shoulder    The patient is also here for left shoulder pain. An MRI from 2006 only revealed mild tendinitis of the left shoulder. Dr. Americo Velazquez thought her pain was a combination of left shoulder impingement and fibromyalgia. According to the chart, she has had a couple injection in the left shoulder which was over 10 years ago. She states that it has been \"way more than 1 year\" since her previous steroid injection in the left shoulder.    Last injection(s):   1. Right Shoulder subacromial steroid injection on 05/24/17. Length of effectiveness: 5 weeks of complete relief  2. Right Shoulder subacromial steroid injection on 07/11/17. Length of effectiveness: 10-11 months  3. Bilateral shoulder injections 9/6/18. Length of effectiveness: 4 months.      Present symptoms: shoulder pain R>L, pain reaching overhead and away from the body, and positional night pain.  Symptoms aren't bad today.    Treatment up to this point: steroid injections.  Has been doing physical therapy.  No definite weakness.    Also concerned about right buttock pain.  Not sure if from her back issues.  Had to have lumbar pain stimulator leads removed recently     Review of Systems:  Constitutional/General: Negative for fever, chills, change in weight  Integumentary/Skin: Negative for worrisome rashes, moles, or lesions  Neuro: Negative for weakness, dizziness, or paresthesias   Psychiatric: negative for changes in mood or affect    OBJECTIVE:  Physical Exam:  /83 (BP Location: Right arm, Patient Position: Sitting, Cuff Size: Adult Regular)   Pulse 78   Ht 1.626 m (5' 4\")   Wt 101.2 kg (223 lb)   LMP " 04/17/2010   SpO2 97%   BMI 38.28 kg/m    General Appearance: healthy, alert and no distress   Skin: no suspicious lesions or rashes  Neuro: Normal strength and tone, mentation intact and speech normal  Vascular: good pulses, and capillary refill   Lymph: no lymphadenopathy   Psych:  mentation appears normal and affect normal/bright  Resp: no increased work of breathing    Bilateral Shoulder Exam:   Left Shoulder Right Shoulder   Tender AC joint, proximal bicep tendon and greater tuberosity AC joint, proximal bicep tendon and greater tuberosity    PROM: forward flexion 140 degrees 140 degrees   Strength: forward flexion 5/5 5/5   Strength: external rotation  5/5 5/5   Impingements (Mckinney, Neer, and AER) 0, 0, 0  0, 0, 0        ASSESSMENT:  1. Rotator cuff tendinitis, left shoulder  2. Suspected rotator cuff tear, left shoulder  3. Calcific rotator cuff tendinitis, right shoulder  4. Possible small rotator cuff tear, right shoulder    PLAN:  She will try some creative pillow use at night and see if that can help the pain at night.    She would like to avoid rotator cuff surgery if possible.  Injections didn't seem necessary today, she is doing fairly well right now.  I emphasized that the patient work on the exercises that she learned in physical therapy for both shoulders.       Return to clinic: as needed     PRANAV Robles MD  Dept. Orthopedic Surgery  [facility]

## 2019-02-21 ENCOUNTER — OFFICE VISIT (OUTPATIENT)
Dept: ORTHOPEDICS | Facility: CLINIC | Age: 64
End: 2019-02-21
Payer: COMMERCIAL

## 2019-02-21 VITALS
HEART RATE: 78 BPM | DIASTOLIC BLOOD PRESSURE: 83 MMHG | OXYGEN SATURATION: 97 % | BODY MASS INDEX: 38.07 KG/M2 | HEIGHT: 64 IN | WEIGHT: 223 LBS | SYSTOLIC BLOOD PRESSURE: 160 MMHG

## 2019-02-21 DIAGNOSIS — M75.31 CALCIFIC TENDINITIS OF RIGHT SHOULDER: ICD-10-CM

## 2019-02-21 DIAGNOSIS — M75.81 TENDINITIS OF RIGHT ROTATOR CUFF: ICD-10-CM

## 2019-02-21 DIAGNOSIS — M75.102 TEAR OF LEFT ROTATOR CUFF, UNSPECIFIED TEAR EXTENT: Primary | ICD-10-CM

## 2019-02-21 PROCEDURE — 99213 OFFICE O/P EST LOW 20 MIN: CPT | Performed by: ORTHOPAEDIC SURGERY

## 2019-02-21 ASSESSMENT — MIFFLIN-ST. JEOR: SCORE: 1541.52

## 2019-02-21 NOTE — LETTER
"    2/21/2019         RE: Violeta Maria  [address]   [place] [address]        Dear Colleague,    Thank you for referring your patient, Violeta Maria, to the [facility]. Please see a copy of my visit note below.    SUBJECTIVE:  Violeta Maria is here in follow up of calcific rotator cuff tendinitis and possible small rotator cuff tear of the right shoulder.   1. Rotator cuff tendinitis, left shoulder  2. Suspected rotator cuff tear, left shoulder  3. Calcific rotator cuff tendinitis, right shoulder  4. Possible small rotator cuff tear, right shoulder    The patient is also here for left shoulder pain. An MRI from 2006 only revealed mild tendinitis of the left shoulder. Dr. Americo Velazquez thought her pain was a combination of left shoulder impingement and fibromyalgia. According to the chart, she has had a couple injection in the left shoulder which was over 10 years ago. She states that it has been \"way more than 1 year\" since her previous steroid injection in the left shoulder.    Last injection(s):   1. Right Shoulder subacromial steroid injection on 05/24/17. Length of effectiveness: 5 weeks of complete relief  2. Right Shoulder subacromial steroid injection on 07/11/17. Length of effectiveness: 10-11 months  3. Bilateral shoulder injections 9/6/18. Length of effectiveness: 4 months.      Present symptoms: shoulder pain R>L, pain reaching overhead and away from the body, and positional night pain.  Symptoms aren't bad today.    Treatment up to this point: steroid injections.  Has been doing physical therapy.  No definite weakness.    Also concerned about right buttock pain.  Not sure if from her back issues.  Had to have lumbar pain stimulator leads removed recently     Review of Systems:  Constitutional/General: Negative for fever, chills, change in weight  Integumentary/Skin: Negative for worrisome rashes, moles, or lesions  Neuro: Negative for weakness, dizziness, or " "paresthesias   Psychiatric: negative for changes in mood or affect    OBJECTIVE:  Physical Exam:  /83 (BP Location: Right arm, Patient Position: Sitting, Cuff Size: Adult Regular)   Pulse 78   Ht 1.626 m (5' 4\")   Wt 101.2 kg (223 lb)   LMP 04/17/2010   SpO2 97%   BMI 38.28 kg/m     General Appearance: healthy, alert and no distress   Skin: no suspicious lesions or rashes  Neuro: Normal strength and tone, mentation intact and speech normal  Vascular: good pulses, and capillary refill   Lymph: no lymphadenopathy   Psych:  mentation appears normal and affect normal/bright  Resp: no increased work of breathing    Bilateral Shoulder Exam:   Left Shoulder Right Shoulder   Tender AC joint, proximal bicep tendon and greater tuberosity AC joint, proximal bicep tendon and greater tuberosity    PROM: forward flexion 140 degrees 140 degrees   Strength: forward flexion 5/5 5/5   Strength: external rotation  5/5 5/5   Impingements (Mckinney, Neer, and AER) 0, 0, 0  0, 0, 0        ASSESSMENT:  1. Rotator cuff tendinitis, left shoulder  2. Suspected rotator cuff tear, left shoulder  3. Calcific rotator cuff tendinitis, right shoulder  4. Possible small rotator cuff tear, right shoulder    PLAN:  She will try some creative pillow use at night and see if that can help the pain at night.    She would like to avoid rotator cuff surgery if possible.  Injections didn't seem necessary today, she is doing fairly well right now.  I emphasized that the patient work on the exercises that she learned in physical therapy for both shoulders.       Return to clinic: as needed     PRANAV Robles MD  Dept. Orthopedic Surgery  Health system        Again, thank you for allowing me to participate in the care of your patient.        Sincerely,        Rodolfo Robles MD    "

## 2019-06-27 ENCOUNTER — OFFICE VISIT (OUTPATIENT)
Dept: ORTHOPEDICS | Facility: CLINIC | Age: 64
End: 2019-06-27
Payer: COMMERCIAL

## 2019-06-27 ENCOUNTER — ANCILLARY PROCEDURE (OUTPATIENT)
Dept: GENERAL RADIOLOGY | Facility: CLINIC | Age: 64
End: 2019-06-27
Attending: ORTHOPAEDIC SURGERY
Payer: COMMERCIAL

## 2019-06-27 VITALS
HEIGHT: 64 IN | BODY MASS INDEX: 38.07 KG/M2 | SYSTOLIC BLOOD PRESSURE: 134 MMHG | OXYGEN SATURATION: 96 % | WEIGHT: 223 LBS | HEART RATE: 75 BPM | DIASTOLIC BLOOD PRESSURE: 82 MMHG

## 2019-06-27 DIAGNOSIS — M70.62 TROCHANTERIC BURSITIS OF LEFT HIP: Primary | ICD-10-CM

## 2019-06-27 DIAGNOSIS — M25.552 LEFT HIP PAIN: ICD-10-CM

## 2019-06-27 PROCEDURE — 99213 OFFICE O/P EST LOW 20 MIN: CPT | Mod: 25 | Performed by: ORTHOPAEDIC SURGERY

## 2019-06-27 PROCEDURE — 73502 X-RAY EXAM HIP UNI 2-3 VIEWS: CPT

## 2019-06-27 PROCEDURE — 20610 DRAIN/INJ JOINT/BURSA W/O US: CPT | Mod: LT | Performed by: ORTHOPAEDIC SURGERY

## 2019-06-27 RX ORDER — METHYLPREDNISOLONE ACETATE 80 MG/ML
80 INJECTION, SUSPENSION INTRA-ARTICULAR; INTRALESIONAL; INTRAMUSCULAR; SOFT TISSUE
Status: SHIPPED | OUTPATIENT
Start: 2019-06-27

## 2019-06-27 RX ORDER — LIDOCAINE HYDROCHLORIDE 10 MG/ML
1 INJECTION, SOLUTION EPIDURAL; INFILTRATION; INTRACAUDAL; PERINEURAL
Status: SHIPPED | OUTPATIENT
Start: 2019-06-27

## 2019-06-27 RX ADMIN — METHYLPREDNISOLONE ACETATE 80 MG: 80 INJECTION, SUSPENSION INTRA-ARTICULAR; INTRALESIONAL; INTRAMUSCULAR; SOFT TISSUE at 14:12

## 2019-06-27 RX ADMIN — LIDOCAINE HYDROCHLORIDE 1 ML: 10 INJECTION, SOLUTION EPIDURAL; INFILTRATION; INTRACAUDAL; PERINEURAL at 14:12

## 2019-06-27 ASSESSMENT — MIFFLIN-ST. JEOR: SCORE: 1541.52

## 2019-06-27 NOTE — LETTER
6/27/2019         RE: Violeta Maria  [address] [place] [address]        Dear Colleague,    Thank you for referring your patient, Violeta Maria, to the [facility]. Please see a copy of my visit note below.    SUBJECTIVE:    Violeta Maria is a 63 year old female who is seen in consultation as a self referral for left hip pain that has been present for 5+ years, on and off. Since onset, pain has gradually worsened in severity. Pain is located over the lateral hip into the buttock and low back. Denies radiating pain into the knee/leg, however does have ongoing radiating pain from the low back. She notes that her hip pain is not related to back pain.     Of note:   This year, she had a cord stimulator placed and had to have it removed because of an increase in pain rather than decrease in pain.  History of bilateral lateral hip pain radiating down the leg, which cannabis has helped manage.  History of lumbar stenosis.     Onset: unknown  Immediate symptoms: none.     Aggravating Activities: pain with palpation  Other Symptoms: has low back pain with radiation of symptoms.     Symptoms have been worsening since that time.  Prior history of related problems: history of bilateral lateral hip pain radiating down the leg..    Patients past medical, surgical, social and family histories reviewed.  Past medical history notable for:  Possibly prediabetes.    REVIEW OF SYSTEMS:  CONSTITUTIONAL:NEGATIVE for fever, chills, change in weight  INTEGUMENTARY/SKIN: NEGATIVE for worrisome rashes, moles or lesions  MUSCULOSKELETAL:See HPI above  NEURO: NEGATIVE for weakness, dizziness or paresthesias    This document serves as a record of the services and decisions personally performed and made by PRANAV Robles MD. It was created on his behalf by Moira Webb, a trained medical scribe. The creation of this document is based the provider's statements to the medical scribe.    Opal Pizarro  "Webb 2:37 PM 6/27/2019        /82 (BP Location: Right arm, Patient Position: Sitting, Cuff Size: Adult Regular)   Pulse 75   Ht 1.626 m (5' 4\")   Wt 101.2 kg (223 lb)   LMP 04/17/2010   SpO2 96%   BMI 38.28 kg/m       EXAM:  GENERAL APPEARANCE: healthy, alert and no distress   GAIT: NORMAL  SKIN: no suspicious lesions or rashes  NEURO: normal strength and tone, sentation intact, reflexes normal, DTR symmetrically normal in upper extremities and DTR symmetrically normal in lower extremities  PSYCH:  mentation appears normal and affect normal/bright    MUSCULOSKELETAL:  LEFT HIP:  Palpation: Tender:   Greater trochanter, minimal tenderness of the sciatic notch, SI joint tenderness (which is chronic), diffuse tenderness all the way down the leg  Range of Motion:  Good hip rotation  Strength:  Good hip flexion and abduction without pain.   Special tests:     Straight leg raise: negative     X-RAY INTERPRETATION:  X-rays of the left hip was taken on 6/27/2019 and reviewed with patient today in clinic. Images show minimal degenerative changes of the hip.       ASSESSMENT  1. Left greater trochanter bursitis versus lumbar mediated pain  2. Fibromyalgia also clouds the clinical picture.     PLAN:  Patient does not have significant arthritis in the hip, therefore I don't think surgery is necessary. Could be related to lumbar spine pain. Could also be related to greater trochanteric bursitis. Greater trochanteric bursitis cortisone injection was offered today for therapeutic and diagnostic relief. If symptoms don't improve, could be related to lumbar issues. At that time, I advised patient to return to clinic for further discussion.     Procedure Note:   Informed consent obtained. Risks, benefits and complications of the injection were discussed with the patient and the patient elected to proceed. Left hip injected in the greater trochanteric bursa with 80mg of Depomedrol and 4cc of local anesthetic after sterile " prep.  She could not tell if any improvement afterward.  I told her to document her pain relief.     Return to clinic as needed     The information in this document, created by a scribe for me, accurately reflects the services I personally performed and the decisions made by me. I have reviewed and approved this document for accuracy.        PRANAV Robles MD  Dept. Orthopedic Surgery  [facility]              Large Joint Injection/Arthocentesis: L greater trochanteric bursa  Date/Time: 6/27/2019 2:12 PM  Performed by: Rodolfo Robles MD  Authorized by: Rodolfo Robles MD     Indications:  Pain  Needle Size:  22 G  Approach:  Lateral  Location:  Hip      Site:  L greater trochanteric bursa  Medications:  1 mL lidocaine (PF) 1 %; 80 mg methylPREDNISolone 80 MG/ML  Outcome:  Tolerated well, no immediate complications  Procedure discussed: discussed risks, benefits, and alternatives    Consent Given by:  Patient  Timeout: timeout called immediately prior to procedure    Prep: patient was prepped and draped in usual sterile fashion            Again, thank you for allowing me to participate in the care of your patient.        Sincerely,        Rodolfo Robles MD

## 2019-06-27 NOTE — PROGRESS NOTES
"SUBJECTIVE:    Violeta Maria is a 63 year old female who is seen in consultation as a self referral for left hip pain that has been present for 5+ years, on and off. Since onset, pain has gradually worsened in severity. Pain is located over the lateral hip into the buttock and low back. Denies radiating pain into the knee/leg, however does have ongoing radiating pain from the low back. She notes that her hip pain is not related to back pain.     Of note:   This year, she had a cord stimulator placed and had to have it removed because of an increase in pain rather than decrease in pain.  History of bilateral lateral hip pain radiating down the leg, which cannabis has helped manage.  History of lumbar stenosis.     Onset: unknown  Immediate symptoms: none.     Aggravating Activities: pain with palpation  Other Symptoms: has low back pain with radiation of symptoms.     Symptoms have been worsening since that time.  Prior history of related problems: history of bilateral lateral hip pain radiating down the leg..    Patients past medical, surgical, social and family histories reviewed.  Past medical history notable for:  Possibly prediabetes.    REVIEW OF SYSTEMS:  CONSTITUTIONAL:NEGATIVE for fever, chills, change in weight  INTEGUMENTARY/SKIN: NEGATIVE for worrisome rashes, moles or lesions  MUSCULOSKELETAL:See HPI above  NEURO: NEGATIVE for weakness, dizziness or paresthesias    This document serves as a record of the services and decisions personally performed and made by PRANAV Robles MD. It was created on his behalf by Moira Webb, a trained medical scribe. The creation of this document is based the provider's statements to the medical scribe.    Scribe Moira Webb 2:37 PM 6/27/2019        /82 (BP Location: Right arm, Patient Position: Sitting, Cuff Size: Adult Regular)   Pulse 75   Ht 1.626 m (5' 4\")   Wt 101.2 kg (223 lb)   LMP 04/17/2010   SpO2 96%   BMI 38.28 kg/m      EXAM:  GENERAL " APPEARANCE: healthy, alert and no distress   GAIT: NORMAL  SKIN: no suspicious lesions or rashes  NEURO: normal strength and tone, sentation intact, reflexes normal, DTR symmetrically normal in upper extremities and DTR symmetrically normal in lower extremities  PSYCH:  mentation appears normal and affect normal/bright    MUSCULOSKELETAL:  LEFT HIP:  Palpation: Tender:   Greater trochanter, minimal tenderness of the sciatic notch, SI joint tenderness (which is chronic), diffuse tenderness all the way down the leg  Range of Motion:  Good hip rotation  Strength:  Good hip flexion and abduction without pain.   Special tests:     Straight leg raise: negative     X-RAY INTERPRETATION:  X-rays of the left hip was taken on 6/27/2019 and reviewed with patient today in clinic. Images show minimal degenerative changes of the hip.       ASSESSMENT  1. Left greater trochanter bursitis versus lumbar mediated pain  2. Fibromyalgia also clouds the clinical picture.     PLAN:  Patient does not have significant arthritis in the hip, therefore I don't think surgery is necessary. Could be related to lumbar spine pain. Could also be related to greater trochanteric bursitis. Greater trochanteric bursitis cortisone injection was offered today for therapeutic and diagnostic relief. If symptoms don't improve, could be related to lumbar issues. At that time, I advised patient to return to clinic for further discussion.     Procedure Note:   Informed consent obtained. Risks, benefits and complications of the injection were discussed with the patient and the patient elected to proceed. Left hip injected in the greater trochanteric bursa with 80mg of Depomedrol and 4cc of local anesthetic after sterile prep.  She could not tell if any improvement afterward.  I told her to document her pain relief.     Return to clinic as needed     The information in this document, created by a scribe for me, accurately reflects the services I personally  performed and the decisions made by me. I have reviewed and approved this document for accuracy.        PRANAV Robles MD  Dept. Orthopedic Surgery  [facility]

## 2019-06-27 NOTE — PROGRESS NOTES
Large Joint Injection/Arthocentesis: L greater trochanteric bursa  Date/Time: 6/27/2019 2:12 PM  Performed by: Rodolfo Robles MD  Authorized by: Rodolfo Robles MD     Indications:  Pain  Needle Size:  22 G  Approach:  Lateral  Location:  Hip      Site:  L greater trochanteric bursa  Medications:  1 mL lidocaine (PF) 1 %; 80 mg methylPREDNISolone 80 MG/ML  Outcome:  Tolerated well, no immediate complications  Procedure discussed: discussed risks, benefits, and alternatives    Consent Given by:  Patient  Timeout: timeout called immediately prior to procedure    Prep: patient was prepped and draped in usual sterile fashion

## 2019-08-12 ENCOUNTER — TELEPHONE (OUTPATIENT)
Dept: ORTHOPEDICS | Facility: CLINIC | Age: 64
End: 2019-08-12

## 2019-08-12 NOTE — TELEPHONE ENCOUNTER
Reason for Call:  Other call back    Detailed comments: Patient has questions about  ultrasounds injection. During patients last visit she was told to get and ultrasound injection shot in her left hip.      Phone Number Patient can be reached at: Home number on file [phone] (home)    Best Time: any    Can we leave a detailed message on this number? YES    Call taken on 8/12/2019 at 4:06 PM by Tiara Peters

## 2019-08-13 DIAGNOSIS — M70.62 TROCHANTERIC BURSITIS, LEFT HIP: Primary | ICD-10-CM

## 2019-08-13 NOTE — TELEPHONE ENCOUNTER
Called and left message for patient to return call to [phone].    Leonardo Evans RN....8/13/2019 8:46 AM

## 2019-08-22 ENCOUNTER — OFFICE VISIT (OUTPATIENT)
Dept: ORTHOPEDICS | Facility: CLINIC | Age: 64
End: 2019-08-22
Payer: COMMERCIAL

## 2019-08-22 DIAGNOSIS — M76.02 GLUTEAL TENDINITIS OF LEFT BUTTOCK: Primary | ICD-10-CM

## 2019-08-22 PROCEDURE — 20611 DRAIN/INJ JOINT/BURSA W/US: CPT | Mod: LT | Performed by: FAMILY MEDICINE

## 2019-08-22 RX ORDER — ROPIVACAINE HYDROCHLORIDE 5 MG/ML
2 INJECTION, SOLUTION EPIDURAL; INFILTRATION; PERINEURAL
Status: SHIPPED | OUTPATIENT
Start: 2019-08-22

## 2019-08-22 RX ORDER — BETAMETHASONE SODIUM PHOSPHATE AND BETAMETHASONE ACETATE 3; 3 MG/ML; MG/ML
6 INJECTION, SUSPENSION INTRA-ARTICULAR; INTRALESIONAL; INTRAMUSCULAR; SOFT TISSUE
Status: SHIPPED | OUTPATIENT
Start: 2019-08-22

## 2019-08-22 RX ADMIN — BETAMETHASONE SODIUM PHOSPHATE AND BETAMETHASONE ACETATE 6 MG: 3; 3 INJECTION, SUSPENSION INTRA-ARTICULAR; INTRALESIONAL; INTRAMUSCULAR; SOFT TISSUE at 13:20

## 2019-08-22 RX ADMIN — ROPIVACAINE HYDROCHLORIDE 2 ML: 5 INJECTION, SOLUTION EPIDURAL; INFILTRATION; PERINEURAL at 13:20

## 2019-08-22 NOTE — PATIENT INSTRUCTIONS
[facility] Injection Discharge Instructions      You may shower, however avoid swimming, tub baths or hot tubs for 24 hours following your procedure    You may have a mild to moderate increase in pain for several days following the injection.    It may take up to 14 days for the steroid medication to start working although you may feel the effect as early as a few days after the procedure.    You may use ice packs for 10-15 minutes, 3 to 4 times a day at the injection site for comfort    You may use anti-inflammatory medications (such as Ibuprofen or Aleve or Advil) or Tylenol for pain control if necessary    If you were fasting, you may resume your normal diet and medications after the procedure      If you experience any of the following, call [facility] @ [phone] or [phone]  -Fever over 100 degree F  -Swelling, bleeding, redness, drainage, warmth at the injection site  - New or worsening pain

## 2019-08-22 NOTE — LETTER
8/22/2019         RE: Violeta Maria  [address]  [place] [address]        Dear Colleague,    Thank you for referring your patient, Violeta Maria, to the [facility]. Please see a copy of my visit note below.    Large Joint Injection/Arthocentesis: L greater trochanteric bursa  Date/Time: 8/22/2019 1:20 PM  Performed by: Ralph Benjamin MD  Authorized by: Ralph Benjamin MD     Indications:  Pain  Needle Size:  22 G  Guidance: ultrasound    Approach:  Lateral  Location:  Hip      Site:  L greater trochanteric bursa  Medications:  6 mg betamethasone acet & sod phos 6 (3-3) MG/ML; 2 mL ropivacaine 5 MG/ML  Outcome:  Tolerated well, no immediate complications  Procedure discussed: discussed risks, benefits, and alternatives    Consent Given by:  Patient  Timeout: timeout called immediately prior to procedure    Prep: patient was prepped and draped in usual sterile fashion     Patient reported some improvement of pain after injection.  Aftercare instructions given to patient.  Plan to follow-up as previously discussed with referring provider.     Ralph Benjamin MD [facility]          Again, thank you for allowing me to participate in the care of your patient.        Sincerely,        Ralph Benjamin MD

## 2019-08-22 NOTE — PROGRESS NOTES
Large Joint Injection/Arthocentesis: L greater trochanteric bursa  Date/Time: 8/22/2019 1:20 PM  Performed by: Ralph Benjamin MD  Authorized by: Ralph Benjamin MD     Indications:  Pain  Needle Size:  22 G  Guidance: ultrasound    Approach:  Lateral  Location:  Hip      Site:  L greater trochanteric bursa  Medications:  6 mg betamethasone acet & sod phos 6 (3-3) MG/ML; 2 mL ropivacaine 5 MG/ML  Outcome:  Tolerated well, no immediate complications  Procedure discussed: discussed risks, benefits, and alternatives    Consent Given by:  Patient  Timeout: timeout called immediately prior to procedure    Prep: patient was prepped and draped in usual sterile fashion     Patient reported some improvement of pain after injection.  Aftercare instructions given to patient.  Plan to follow-up as previously discussed with referring provider.     Ralph Benjamin MD [facility]

## 2019-11-20 ENCOUNTER — ANESTHESIA EVENT (OUTPATIENT)
Dept: SURGERY | Facility: CLINIC | Age: 64
End: 2019-11-20

## 2019-11-21 ENCOUNTER — ANESTHESIA (OUTPATIENT)
Dept: SURGERY | Facility: CLINIC | Age: 64
End: 2019-11-21

## 2019-11-21 ENCOUNTER — HOSPITAL ENCOUNTER (OUTPATIENT)
Facility: CLINIC | Age: 64
Discharge: HOME | End: 2019-11-21
Attending: PHYSICAL MEDICINE & REHABILITATION | Admitting: PHYSICAL MEDICINE & REHABILITATION
Payer: COMMERCIAL

## 2019-11-21 ENCOUNTER — APPOINTMENT (OUTPATIENT)
Dept: GENERAL RADIOLOGY | Facility: CLINIC | Age: 64
End: 2019-11-21
Attending: PHYSICAL MEDICINE & REHABILITATION
Payer: COMMERCIAL

## 2019-11-21 VITALS
OXYGEN SATURATION: 98 % | HEIGHT: 64 IN | WEIGHT: 215.5 LBS | DIASTOLIC BLOOD PRESSURE: 88 MMHG | TEMPERATURE: 97 F | HEART RATE: 84 BPM | SYSTOLIC BLOOD PRESSURE: 145 MMHG | RESPIRATION RATE: 16 BRPM | BODY MASS INDEX: 36.79 KG/M2

## 2019-11-21 PROCEDURE — 40000170 ZZH STATISTIC PRE-PROCEDURE ASSESSMENT II: Performed by: PHYSICAL MEDICINE & REHABILITATION

## 2019-11-21 PROCEDURE — 36000063 ZZH SURGERY LEVEL 4 EA 15 ADDTL MIN: Performed by: PHYSICAL MEDICINE & REHABILITATION

## 2019-11-21 PROCEDURE — 25000125 ZZHC RX 250: Performed by: PHYSICAL MEDICINE & REHABILITATION

## 2019-11-21 PROCEDURE — 27210794 ZZH OR GENERAL SUPPLY STERILE: Performed by: PHYSICAL MEDICINE & REHABILITATION

## 2019-11-21 PROCEDURE — 36000065 ZZH SURGERY LEVEL 4 W FLUORO 1ST 30 MIN: Performed by: PHYSICAL MEDICINE & REHABILITATION

## 2019-11-21 PROCEDURE — 40000277 XR SURGERY CARM FLUORO LESS THAN 5 MIN W STILLS

## 2019-11-21 PROCEDURE — 71000027 ZZH RECOVERY PHASE 2 EACH 15 MINS: Performed by: PHYSICAL MEDICINE & REHABILITATION

## 2019-11-21 RX ORDER — HYDROMORPHONE HYDROCHLORIDE 1 MG/ML
0.1 INJECTION, SOLUTION INTRAMUSCULAR; INTRAVENOUS; SUBCUTANEOUS ONCE
Status: DISCONTINUED | OUTPATIENT
Start: 2019-11-21 | End: 2019-11-21

## 2019-11-21 RX ORDER — BENZONATATE 200 MG/1
200 CAPSULE ORAL 3 TIMES DAILY PRN
COMMUNITY

## 2019-11-21 RX ORDER — SODIUM CHLORIDE, SODIUM LACTATE, POTASSIUM CHLORIDE, CALCIUM CHLORIDE 600; 310; 30; 20 MG/100ML; MG/100ML; MG/100ML; MG/100ML
INJECTION, SOLUTION INTRAVENOUS CONTINUOUS
Status: DISCONTINUED | OUTPATIENT
Start: 2019-11-21 | End: 2019-11-21 | Stop reason: HOSPADM

## 2019-11-21 RX ORDER — DEXAMETHASONE SODIUM PHOSPHATE 4 MG/ML
4 INJECTION, SOLUTION INTRA-ARTICULAR; INTRALESIONAL; INTRAMUSCULAR; INTRAVENOUS; SOFT TISSUE EVERY 10 MIN PRN
Status: DISCONTINUED | OUTPATIENT
Start: 2019-11-21 | End: 2019-11-21 | Stop reason: HOSPADM

## 2019-11-21 RX ORDER — DEXTROSE MONOHYDRATE 25 G/50ML
25-50 INJECTION, SOLUTION INTRAVENOUS
Status: DISCONTINUED | OUTPATIENT
Start: 2019-11-21 | End: 2019-11-21 | Stop reason: HOSPADM

## 2019-11-21 RX ORDER — FENTANYL CITRATE 0.05 MG/ML
25-50 INJECTION, SOLUTION INTRAMUSCULAR; INTRAVENOUS
Status: CANCELLED | OUTPATIENT
Start: 2019-11-21

## 2019-11-21 RX ORDER — MELOXICAM 7.5 MG/1
7.5 TABLET ORAL DAILY PRN
COMMUNITY

## 2019-11-21 RX ORDER — ONDANSETRON 4 MG/1
4 TABLET, ORALLY DISINTEGRATING ORAL EVERY 30 MIN PRN
Status: DISCONTINUED | OUTPATIENT
Start: 2019-11-21 | End: 2019-11-21 | Stop reason: HOSPADM

## 2019-11-21 RX ORDER — ACETAMINOPHEN 500 MG
500-1000 TABLET ORAL EVERY 6 HOURS PRN
COMMUNITY

## 2019-11-21 RX ORDER — LABETALOL HYDROCHLORIDE 5 MG/ML
10 INJECTION, SOLUTION INTRAVENOUS
Status: DISCONTINUED | OUTPATIENT
Start: 2019-11-21 | End: 2019-11-21 | Stop reason: HOSPADM

## 2019-11-21 RX ORDER — CETIRIZINE HYDROCHLORIDE 10 MG/1
10 TABLET ORAL DAILY
COMMUNITY

## 2019-11-21 RX ORDER — HYDROMORPHONE HYDROCHLORIDE 1 MG/ML
.3-.5 INJECTION, SOLUTION INTRAMUSCULAR; INTRAVENOUS; SUBCUTANEOUS EVERY 10 MIN PRN
Status: DISCONTINUED | OUTPATIENT
Start: 2019-11-21 | End: 2019-11-21 | Stop reason: HOSPADM

## 2019-11-21 RX ORDER — NICOTINE POLACRILEX 4 MG
15-30 LOZENGE BUCCAL
Status: DISCONTINUED | OUTPATIENT
Start: 2019-11-21 | End: 2019-11-21 | Stop reason: HOSPADM

## 2019-11-21 RX ORDER — ONDANSETRON 2 MG/ML
4 INJECTION INTRAMUSCULAR; INTRAVENOUS EVERY 30 MIN PRN
Status: DISCONTINUED | OUTPATIENT
Start: 2019-11-21 | End: 2019-11-21 | Stop reason: HOSPADM

## 2019-11-21 RX ORDER — NALOXONE HYDROCHLORIDE 0.4 MG/ML
.1-.4 INJECTION, SOLUTION INTRAMUSCULAR; INTRAVENOUS; SUBCUTANEOUS
Status: DISCONTINUED | OUTPATIENT
Start: 2019-11-21 | End: 2019-11-21 | Stop reason: HOSPADM

## 2019-11-21 RX ORDER — FENTANYL CITRATE 50 UG/ML
25-50 INJECTION, SOLUTION INTRAMUSCULAR; INTRAVENOUS
Status: DISCONTINUED | OUTPATIENT
Start: 2019-11-21 | End: 2019-11-21 | Stop reason: HOSPADM

## 2019-11-21 RX ORDER — AZITHROMYCIN 250 MG/1
250 TABLET, FILM COATED ORAL
COMMUNITY

## 2019-11-21 RX ORDER — LIDOCAINE HYDROCHLORIDE AND EPINEPHRINE 10; 10 MG/ML; UG/ML
INJECTION, SOLUTION INFILTRATION; PERINEURAL PRN
Status: DISCONTINUED | OUTPATIENT
Start: 2019-11-21 | End: 2019-11-21 | Stop reason: HOSPADM

## 2019-11-21 RX ORDER — HYDRALAZINE HYDROCHLORIDE 20 MG/ML
2.5-5 INJECTION INTRAMUSCULAR; INTRAVENOUS EVERY 10 MIN PRN
Status: DISCONTINUED | OUTPATIENT
Start: 2019-11-21 | End: 2019-11-21 | Stop reason: HOSPADM

## 2019-11-21 RX ORDER — MEPERIDINE HYDROCHLORIDE 25 MG/ML
12.5 INJECTION INTRAMUSCULAR; INTRAVENOUS; SUBCUTANEOUS
Status: DISCONTINUED | OUTPATIENT
Start: 2019-11-21 | End: 2019-11-21 | Stop reason: HOSPADM

## 2019-11-21 RX ORDER — LIDOCAINE 40 MG/G
CREAM TOPICAL
Status: DISCONTINUED | OUTPATIENT
Start: 2019-11-21 | End: 2019-11-21 | Stop reason: HOSPADM

## 2019-11-21 ASSESSMENT — MIFFLIN-ST. JEOR: SCORE: 1502.5

## 2019-11-21 ASSESSMENT — LIFESTYLE VARIABLES: TOBACCO_USE: 1

## 2019-11-21 ASSESSMENT — COPD QUESTIONNAIRES: COPD: 1

## 2019-11-21 ASSESSMENT — ENCOUNTER SYMPTOMS
DYSRHYTHMIAS: 0
SEIZURES: 0

## 2019-11-21 NOTE — PROGRESS NOTES
Admission medication history interview status for the 11/21/2019  admission is complete. See EPIC admission navigator for prior to admission medications     Medication history source reliability:Good    Medication history interview source(s):Patient    Medication history resources (including written lists, pill bottles, clinic record):None    Primary pharmacy.[facility]    Additional medication history information not noted on PTA med list :None    Time spent in this activity: 45 minutes    Prior to Admission medications    Medication Sig Last Dose Taking? Auth Provider   acetaminophen (TYLENOL) 500 MG tablet Take 500-1,000 mg by mouth every 6 hours as needed for mild pain more than a week at prn Yes Reported, Patient   albuterol (2.5 MG/3ML) 0.083% neb solution Take 2.5 mg by nebulization every 6 hours as needed for shortness of breath / dyspnea or wheezing more than a week at prn Yes Reported, Patient   albuterol (PROAIR HFA/PROVENTIL HFA/VENTOLIN HFA) 108 (90 Base) MCG/ACT inhaler Inhale 2 puffs into the lungs every 4 hours as needed for shortness of breath / dyspnea or wheezing more than a week at prn Yes Reported, Patient   ALPRAZolam (XANAX) 0.5 MG tablet Take 0.25-0.5 mg by mouth daily as needed for anxiety  more than a week at prn Yes Reported, Patient   azelastine (ASTELIN) 0.1 % nasal spray Spray 1 spray into both nostrils 2 times daily 11/20/2019 at pm Yes Reported, Patient   azithromycin (ZITHROMAX) 250 MG tablet Take 250 mg by mouth three times a week 11/20/2019 at pm Yes Reported, Patient   benzonatate (TESSALON) 200 MG capsule Take 200 mg by mouth 3 times daily as needed for cough 11/21/2019 at 1000 Yes Reported, Patient   bimatoprost (LUMIGAN) 0.01 % SOLN Place 1 drop into both eyes At Bedtime  11/20/2019 at pm Yes Reported, Patient   bumetanide (BUMEX) 1 MG tablet Take 1 mg by mouth daily as needed for edema  more than a week at prn Yes Reported, Patient   CALCIUM PO Take 1,200-1,500 mg by mouth 3 times  daily 11/20/2019 at pm Yes Reported, Patient   cetirizine (ZYRTEC) 10 MG tablet Take 10 mg by mouth daily 11/20/2019 at 1200 Yes Reported, Patient   Coenzyme Q10 (COQ-10 PO) Take 1 tablet by mouth daily 11/20/2019 at Unknown time Yes Reported, Patient   Cyanocobalamin (B-12 SL) Place 5,000 mcg under the tongue daily  11/20/2019 at Unknown time Yes Reported, Patient   cycloSPORINE (RESTASIS) 0.05 % ophthalmic emulsion Place 1 drop into both eyes 4 times daily as needed  11/21/2019 at am Yes Reported, Patient   DOCUSATE CALCIUM PO Take 300 mg by mouth At Bedtime (3 x 100 mg = 300 mg dose) 11/20/2019 at pm Yes Reported, Patient   DULoxetine (CYMBALTA) 60 MG capsule Take 60 mg by mouth 2 times daily (2 x 60 mg = 120 mg dose)  11/21/2019 at am Yes Reported, Patient   estradiol (ESTRACE) 0.1 MG/GM cream Place 2 g vaginally daily as needed  more than a week at prn Yes Reported, Patient   eszopiclone (LUNESTA) 1 MG TABS Take 0.5-1 mg by mouth At Bedtime  11/20/2019 at pm Yes Reported, Patient   ferrous fumarate 65 mg, [place]. FE,-Vitamin C 125 mg (VITRON C)  MG TABS tablet Take 1 tablet by mouth daily 11/20/2019 at am Yes Reported, Patient   fluconazole (DIFLUCAN) 200 MG tablet Take 200 mg by mouth daily as needed (When taking an antibiotic to prevent Thrush)  more than a week at prn Yes Reported, Patient   fluorometholone (FML LIQUIFILM) 0.1 % ophthalmic susp Place 1 drop into both eyes every other day  11/20/2019 at Unknown time Yes Reported, Patient   fluticasone (FLONASE) 50 MCG/ACT nasal spray Spray 1 spray into both nostrils 2 times daily  11/21/2019 at am Yes Reported, Patient   fluticasone-salmeterol (ADVAIR) 250-50 MCG/DOSE diskus inhaler Inhale 1 puff into the lungs 2 times daily 11/21/2019 at am Yes Reported, Patient   glucosamine-chondroitinoitin 0928-3084 MG/30ML LIQD Take 15 mLs by mouth daily 11/20/2019 at am Yes Reported, Patient   guaiFENesin-dextromethorphan (ROBITUSSIN DM) 100-10 MG/5ML syrup Take 10  mLs by mouth 2 times daily as needed for cough 11/21/2019 at 0800 Yes Reported, Patient   hydrocortisone (ANUSOL-HC) 2.5 % cream Place rectally 2 times daily as needed  more than a week at prn Yes Reported, Patient   hydrOXYzine (VISTARIL) 25 MG capsule Take 1 capsule (25 mg) by mouth 3 times daily as needed for itching (spasm)  Patient taking differently: Take 25 mg by mouth 3 times daily as needed (Pain)  more than a week at prn Yes Abdon Barahona MD   ipratropium - albuterol 0.5 mg/2.5 mg/3 mL (DUONEB) 0.5-2.5 (3) MG/3ML neb solution Take 1 vial by nebulization every 6 hours as needed for shortness of breath / dyspnea or wheezing more than a week at prn Yes Reported, Patient   ketoconazole (NIZORAL) 2 % cream Apply topically daily as needed for itching  more than a month at prn Yes Reported, Patient   lamoTRIgine (LAMICTAL) 150 MG tablet Take 300 mg by mouth daily (Takes 2 x 150mg tablet = 300mg dose)  11/20/2019 at am Yes Reported, Patient   levothyroxine (SYNTHROID) 125 MCG tablet Take 125 mcg by mouth daily  11/21/2019 at 0700 Yes Reported, Patient   lidocaine (ASPERCREME W/LIDOCAINE) 4 % CREA cream Apply topically 3 times daily as needed for mild pain 11/16/2019 Yes Reported, Patient   medical cannabis (Patient's own supply) Inhale 1 Dose into the lungs daily as needed THC/CBD Vaporizer  Filled by [place] [employer] [facility]    (The purpose of this order is to document that the patient reports taking medical cannabis.  This is not a prescription, and is not used to certify that the patient has a qualifying medical condition.) 11/18/2019 Yes Reported, Patient   medical cannabis (Patient's own supply) by Other route daily as needed Topical Cannabis Bar  Filled by [place] [employer]    (The purpose of this order is to document that the patient reports taking medical cannabis.  This is not a prescription, and is not used to certify that the patient has a qualifying medical condition.) 11/20/2019 at pm Yes Reported, Patient    medical cannabis (Patient's own supply) Take 1 Dose by mouth daily as needed Oral Capsules - Yellow  Filled by [facility]    (The purpose of this order is to document that the patient reports taking medical cannabis.  This is not a prescription, and is not used to certify that the patient has a qualifying medical condition.) 11/21/2019 at 0700 Yes Reported, Patient   medical cannabis (Patient's own supply) Take 1 Dose by mouth daily as needed Oral Capsules - Red  Filled by [facility]    (The purpose of this order is to document that the patient reports taking medical cannabis.  This is not a prescription, and is not used to certify that the patient has a qualifying medical condition.) 11/21/2019 at 0700 Yes Reported, Patient   meloxicam (MOBIC) 7.5 MG tablet Take 7.5 mg by mouth daily as needed more than a week at prn Yes Reported, Patient   metaxalone (SKELAXIN) 800 MG tablet Take 800 mg by mouth 2 times daily as needed  11/21/2019 at am Yes Reported, Patient   metoclopramide (REGLAN) 10 MG tablet Take 10 mg by mouth every 6 hours as needed  more than a week at prn Yes Reported, Patient   montelukast (SINGULAIR) 10 MG tablet Take 10 mg by mouth daily  11/20/2019 at 1200 Yes Reported, Patient   mupirocin (BACTROBAN) 2 % ointment Apply topically 3 times daily as needed  11/21/2019 at Unknown time Yes Reported, Patient   Nitroglycerin (NITROSTAT SL) Place 0.4 mg under the tongue every 5 minutes as needed for other (sphincter of oddi spasms)  more than a week at prn Yes Reported, Patient   nystatin (NYSTOP) 424381 UNIT/GM POWD Apply topically 2 times daily as needed 11/20/2019 at prn Yes Reported, Patient   nystatin-triamcinolone (MYCOLOG II) cream Apply topically 2 times daily as needed  11/20/2019 at prn Yes Reported, Patient   Omega-3 Fatty Acids (FISH OIL PO) Take 2 capsules by mouth 4 times daily  more than a week Yes Reported, Patient   Pediatric Multivitamins-Iron (FLINTSTONES PLUS IRON PO) Take 2 tablets by  mouth daily 11/20/2019 at Unknown time Yes Reported, Patient   Polyethylene Glycol 3350 (MIRALAX PO) Take 17 g by mouth daily as needed  more than a week at prn Yes Reported, Patient   Potassium Chloride Ailyn ER (K-DUR PO) Take 20 mEq by mouth daily as needed IF TAKING BUMEX  more than a week Yes Reported, Patient   pregabalin (LYRICA) 150 MG capsule Take 150 mg by mouth 3 times daily  11/21/2019 at am Yes Reported, Patient   ranitidine (ZANTAC) 150 MG tablet Take 150 mg by mouth 2 times daily  11/21/2019 at am Yes Reported, Patient   traZODone (DESYREL) 50 MG tablet Take  mg by mouth At Bedtime  11/20/2019 at pm Yes Reported, Patient   VITAMIN D, CHOLECALCIFEROL, PO Take 5,000 Units by mouth daily 11/20/2019 at am Yes Reported, Patient

## 2019-11-21 NOTE — OR NURSING
Some minor changed done to Intracathecal pump trial discharge instructions done- OK'ed per Dr Stevenson

## 2019-11-21 NOTE — ANESTHESIA PREPROCEDURE EVALUATION
Anesthesia Pre-Procedure Evaluation    Patient: Violeta Maria   MRN: [id number] : 1955          Preoperative Diagnosis: Chronic low back pain without sciatica, unspecified back pain laterality [M54.5, G89.29]    Procedure(s):  INTRATHECAL PUMP TRIAL (MEDTRONIC)    Past Medical History:   Diagnosis Date     Acquired hypothyroidism      Allergic rhinitis      Anemia      Arthritis      Chronic constipation      Chronic pain syndrome      COPD (chronic obstructive pulmonary disease) (H)      Depression with anxiety      DJD (degenerative joint disease)      Dysphagia      Fibromyalgia      GERD (gastroesophageal reflux disease)      Glaucoma      IBS (irritable bowel syndrome)      IgG deficiency (H)      Irregular heart beat     PVCs     Lymphedema      Memory loss      MMT (medial meniscus tear)     RIGHT     Morbid obesity (H)      DANIELITO on CPAP      Osteopenia      Other chronic pain      Pain medication agreement      Polyarthritis      Primary insomnia      Psoriasis      PVD (peripheral vascular disease) (H)      Recurrent pancreatitis      Past Surgical History:   Procedure Laterality Date     C TOTAL KNEE ARTHROPLASTY Left 2013    DML     C TOTAL KNEE ARTHROPLASTY Right 07/15/2015    DML     CHOLECYSTECTOMY       COLONOSCOPY       ENT SURGERY      TONSILLECTOMY     GASTRIC BYPASS           GENITOURINARY SURGERY      CYSTOURETHROSCOPY     GI SURGERY      EGD, GASTROSTOMY TUBE/ERCP     GYN SURGERY      , D&C, HYSTEROSCOPY, POLYPECTOMY     HC KNEE SCOPE,SINGLE MENISECTOMY  12    Right, BOTH MEDIAL AND LATERAL MENISECTOMIES     INSERT STIMULATOR AND LEADS INTERNAL DORSAL COLUMN N/A 10/18/2018    Procedure: SPINAL CORD STIMULATOR IMPLANT;  Surgeon: Caty Serra DO;  Location: [facility]     PARTIAL SECOND RAY RESECTION LEFT FOOT         Anesthesia Evaluation     . Pt has had prior anesthetic.            ROS/MED HX    ENT/Pulmonary:     (+)sleep apnea, allergic rhinitis, tobacco use,  "COPD, uses CPAP , . .    Neurologic:      (-) seizures, CVA and migraines   Cardiovascular:     (+) -Peripheral Vascular Disease---. : . . . :. Irregular Heartbeat/Palpitations, .      (-) hypertension, CAD, BRYSON, arrhythmias and dyslipidemia   METS/Exercise Tolerance:  >4 METS   Hematologic:     (+) Anemia, -      Musculoskeletal:   (+) arthritis,  other musculoskeletal- osteoporosis, lymphedema      GI/Hepatic:     (+) GERD Other GI/Hepatic sphincter of oddi dysfunction causing recurrent pancreatitis     (-) liver disease   Renal/Genitourinary:      (-) renal disease and nephrolithiasis   Endo:     (+) thyroid problem hypothyroidism, Obesity, .   (-) Type I DM and Type II DM   Psychiatric:     (+) psychiatric history anxiety and depression      Infectious Disease:        (-) Recent Fever   Malignancy:         Other: Comment: Fibromyalgia   (+) H/O Chronic Pain,H/O chronic opiod use ,                         Physical Exam  Normal systems: cardiovascular and pulmonary    Airway   Mallampati: II  TM distance: >3 FB  Neck ROM: full    Dental   (+) upper dentures and lower dentures    Cardiovascular       Pulmonary             Lab Results   Component Value Date    CRP 12.1 (H) 08/06/2015    SED 28 08/06/2015    POTASSIUM 4.3 06/30/2015    CR 0.92 06/30/2015    GLC 94 06/30/2015       Preop Vitals  BP Readings from Last 3 Encounters:   06/27/19 134/82   02/21/19 160/83   10/18/18 124/88    Pulse Readings from Last 3 Encounters:   06/27/19 75   02/21/19 78   10/18/18 70      Resp Readings from Last 3 Encounters:   10/18/18 16   03/27/17 18   10/01/15 20    SpO2 Readings from Last 3 Encounters:   06/27/19 96%   02/21/19 97%   10/18/18 98%      Temp Readings from Last 1 Encounters:   10/18/18 36.7  C (98  F) (Temporal)    Ht Readings from Last 1 Encounters:   06/27/19 1.626 m (5' 4\")      Wt Readings from Last 1 Encounters:   06/27/19 101.2 kg (223 lb)    Estimated body mass index is 38.28 kg/m  as calculated from the " "following:    Height as of 6/27/19: 1.626 m (5' 4\").    Weight as of 6/27/19: 101.2 kg (223 lb).                   Arturo Hernandez MD  "

## 2019-11-21 NOTE — PROCEDURES
"Procedure: Microdose intrathecal pain pump trial    Sedation: Local anesthesia only  Medication: 1 mL of 0.1 mg/mL hydromorphone    Description: The procedure, indications, risks, and alternatives to therapy were discussed with the patient. Written informed consent was obtained. The patient was placed prone of the fluoroscopic table. Chaitanya Herzog MD initiated a \"pause of the cause\" stating the patients's name, date of birth, procedure to be performed, and allergies.     The skin around the area to be injected was prepped and draped in the usual sterile fashion. Using sterile technique, fluoroscopic guidance, and 5 ml of lidocaine 1% as local anesthetic, the 22 gauge spinal needle was advanced to the L3-4 interlaminar level. The needle was advanced until it reached the intrathecal space as confirmed by lateral xray views. 1 mL of 0.1 mg/mL hydromorphone was then injected into the intrathecal space.    The patient tolerated the procedure well and no immediate complications were encountered. The patient was transferred by cart back to recovery and reminded the goal of the trial was to achieve at least 50% relief. Also, the major risk factors, including bladder retention, constipation, respiratory depression, and oversedation were explained to the patient in detail.     Once the patient's trial was completed, the patient laid flat for 30 minutes. The patient tolerated the trial of microdose intrathecal opioid. The patient was discharged home with a . The patient was instructed to resume lying flat at home and to watch for signs/symptoms of spinal headache. The patient was advised to resume normal home medications. The patient and  showed understanding of discharge instructions.      Chaitanya Herzog MD   [facility]    Comments: There was a delay in starting the case because the medication had not been ordered.    "

## 2019-11-21 NOTE — OR NURSING
"Pain is now completely gone per pt- states \"This is the firt time in 20 years I have had no pain. Slightly teary. Pt does report some increased itching to site- instructed pt and family that if itching becomes unbearable and/or if any respiratory issues noted- to call Dr Stevenson and if medical emergency to call 9-11. AOX3-VSS-O2 sats >92% RA- Good PO intake, Denies c/o- Pt and responsible adult verbalize understanding of discharge instructions, denies questions. Up in W/C - transported to door for discharge to home.   "

## 2019-11-21 NOTE — DISCHARGE INSTRUCTIONS
You should not drive till tomorrow because of the pain medication you received in the OR    You may restart your medical marijuana mediation tomorrow    Intrathecal Pump Trial Discharge Instructions     After Surgery:     Resume light activity as tolerated    Resume your regular pain medication regimen    Begin taking your oral antibiotic the day after your surgery.  It is important you finish the entire course of medication until gone.    Site Care:    You may remove banaid aid in 48 hours and shower at that time.  .      After the bandages are removed, check your incision sites daily.  Keep sites clean and dry.  .    NO soaking your incisions for 2 weeks.  This includes baths, whirlpools, swimming pools, and hot tubs.    Call [facility] during business hours or the on-call call provider after hours if you develop any of the following:    Signs of infection such as: fevers, chills, increased pain, drainage (as mentioned above), redness, and swelling from your incision sites.    Headache persisting for more than 48 hours    Unusual changes in or stopping of sensation in your legs or back.     Uncontrolled pain,     Signs or symptoms of a deep vein thrombosis (DVT) : swelling in one or both legs, pain or tenderness in one or both legs, warm skin on your leg, and/or red or discolored skin on your leg    [facility]  [phone]    After hours on-call provider     Emergency Situations go to the Emergency Department or Call 911:    Sudden severe back pain    Sudden onset of leg weakness and spasms    Loss of bladder control and/or bowel function    Signs or symptoms or a pulmonary embolism (PE): sudden coughing --which may bring up blood, sharp chest pain, rapid breathing or shortness of breath, and/or severe light headedness         Reasons to contact your surgeon:    1. Signs of possible infection: Check your incision daily for redness, swelling, warmth, red streaks or foul  drainage.   2. Elevated temperature.  3. Pain not controlled with pain medication and/or rest.   4. Uncontrolled nausea or vomiting.  5. Any questions or concerns.

## 2019-11-21 NOTE — OR NURSING
Hydromorphone 0.1mg to Normal Saline 0.9ml final concentration of 0.1mg/ml  In 1ml syringe mixed by [employer]---ordered by Dr Herzog, administered by Dr Herzog

## 2019-11-21 NOTE — OR NURSING
Dr Stevenson at bedside- pt to be kept flat for 20 minutes- then OK to get up to chair and transport to phase 2- phase 2 should monitor for pain control or increases Q 15 minutes for next 90 minutes to 120 minutes.

## 2020-02-16 ENCOUNTER — HEALTH MAINTENANCE LETTER (OUTPATIENT)
Age: 65
End: 2020-02-16

## 2020-03-04 RX ORDER — DIAZEPAM 5 MG
5-7.5 TABLET ORAL EVERY 6 HOURS PRN
COMMUNITY

## 2020-03-05 ENCOUNTER — APPOINTMENT (OUTPATIENT)
Dept: GENERAL RADIOLOGY | Facility: CLINIC | Age: 65
End: 2020-03-05
Attending: PHYSICAL MEDICINE & REHABILITATION
Payer: COMMERCIAL

## 2020-03-05 ENCOUNTER — ANESTHESIA EVENT (OUTPATIENT)
Dept: SURGERY | Facility: CLINIC | Age: 65
End: 2020-03-05
Payer: COMMERCIAL

## 2020-03-05 ENCOUNTER — ANESTHESIA (OUTPATIENT)
Dept: SURGERY | Facility: CLINIC | Age: 65
End: 2020-03-05
Payer: COMMERCIAL

## 2020-03-05 ENCOUNTER — HOSPITAL ENCOUNTER (OUTPATIENT)
Facility: CLINIC | Age: 65
Discharge: HOME | End: 2020-03-05
Attending: PHYSICAL MEDICINE & REHABILITATION | Admitting: PHYSICAL MEDICINE & REHABILITATION
Payer: COMMERCIAL

## 2020-03-05 VITALS
HEART RATE: 76 BPM | OXYGEN SATURATION: 93 % | HEIGHT: 64 IN | SYSTOLIC BLOOD PRESSURE: 123 MMHG | DIASTOLIC BLOOD PRESSURE: 79 MMHG | BODY MASS INDEX: 39.76 KG/M2 | RESPIRATION RATE: 18 BRPM | WEIGHT: 232.9 LBS | TEMPERATURE: 97.3 F

## 2020-03-05 LAB — POTASSIUM SERPL-SCNC: 3.9 MMOL/L (ref 3.4–5.3)

## 2020-03-05 PROCEDURE — C1772 INFUSION PUMP, PROGRAMMABLE: HCPCS | Performed by: PHYSICAL MEDICINE & REHABILITATION

## 2020-03-05 PROCEDURE — 25000566 ZZH SEVOFLURANE, EA 15 MIN: Performed by: PHYSICAL MEDICINE & REHABILITATION

## 2020-03-05 PROCEDURE — 25000125 ZZHC RX 250: Performed by: NURSE ANESTHETIST, CERTIFIED REGISTERED

## 2020-03-05 PROCEDURE — 25000128 H RX IP 250 OP 636: Performed by: NURSE ANESTHETIST, CERTIFIED REGISTERED

## 2020-03-05 PROCEDURE — 71000012 ZZH RECOVERY PHASE 1 LEVEL 1 FIRST HR: Performed by: PHYSICAL MEDICINE & REHABILITATION

## 2020-03-05 PROCEDURE — C1755 CATHETER, INTRASPINAL: HCPCS | Performed by: PHYSICAL MEDICINE & REHABILITATION

## 2020-03-05 PROCEDURE — 25000125 ZZHC RX 250: Performed by: PHYSICAL MEDICINE & REHABILITATION

## 2020-03-05 PROCEDURE — 84132 ASSAY OF SERUM POTASSIUM: CPT | Performed by: ANESTHESIOLOGY

## 2020-03-05 PROCEDURE — 71000027 ZZH RECOVERY PHASE 2 EACH 15 MINS: Performed by: PHYSICAL MEDICINE & REHABILITATION

## 2020-03-05 PROCEDURE — 25000128 H RX IP 250 OP 636: Performed by: ANESTHESIOLOGY

## 2020-03-05 PROCEDURE — 36000065 ZZH SURGERY LEVEL 4 W FLUORO 1ST 30 MIN: Performed by: PHYSICAL MEDICINE & REHABILITATION

## 2020-03-05 PROCEDURE — 37000009 ZZH ANESTHESIA TECHNICAL FEE, EACH ADDTL 15 MIN: Performed by: PHYSICAL MEDICINE & REHABILITATION

## 2020-03-05 PROCEDURE — 27210794 ZZH OR GENERAL SUPPLY STERILE: Performed by: PHYSICAL MEDICINE & REHABILITATION

## 2020-03-05 PROCEDURE — 25000132 ZZH RX MED GY IP 250 OP 250 PS 637: Performed by: ANESTHESIOLOGY

## 2020-03-05 PROCEDURE — 40000277 XR SURGERY CARM FLUORO LESS THAN 5 MIN W STILLS

## 2020-03-05 PROCEDURE — 71000013 ZZH RECOVERY PHASE 1 LEVEL 1 EA ADDTL HR: Performed by: PHYSICAL MEDICINE & REHABILITATION

## 2020-03-05 PROCEDURE — 36415 COLL VENOUS BLD VENIPUNCTURE: CPT | Performed by: ANESTHESIOLOGY

## 2020-03-05 PROCEDURE — 25000128 H RX IP 250 OP 636: Performed by: PHYSICAL MEDICINE & REHABILITATION

## 2020-03-05 PROCEDURE — 25800030 ZZH RX IP 258 OP 636: Performed by: ANESTHESIOLOGY

## 2020-03-05 PROCEDURE — C1787 PATIENT PROGR, NEUROSTIM: HCPCS | Performed by: PHYSICAL MEDICINE & REHABILITATION

## 2020-03-05 PROCEDURE — 36000063 ZZH SURGERY LEVEL 4 EA 15 ADDTL MIN: Performed by: PHYSICAL MEDICINE & REHABILITATION

## 2020-03-05 PROCEDURE — 37000008 ZZH ANESTHESIA TECHNICAL FEE, 1ST 30 MIN: Performed by: PHYSICAL MEDICINE & REHABILITATION

## 2020-03-05 PROCEDURE — 40000170 ZZH STATISTIC PRE-PROCEDURE ASSESSMENT II: Performed by: PHYSICAL MEDICINE & REHABILITATION

## 2020-03-05 PROCEDURE — 25800030 ZZH RX IP 258 OP 636: Performed by: NURSE ANESTHETIST, CERTIFIED REGISTERED

## 2020-03-05 DEVICE — IMP CATH ASCENDA W/86CM SPINAL SEGMENT 8780: Type: IMPLANTABLE DEVICE | Site: BACK | Status: FUNCTIONAL

## 2020-03-05 DEVICE — PUMP SYNCHROMED II 20ML 8637-20: Type: IMPLANTABLE DEVICE | Site: BACK | Status: FUNCTIONAL

## 2020-03-05 RX ORDER — ONDANSETRON 2 MG/ML
INJECTION INTRAMUSCULAR; INTRAVENOUS PRN
Status: DISCONTINUED | OUTPATIENT
Start: 2020-03-05 | End: 2020-03-05

## 2020-03-05 RX ORDER — FENTANYL CITRATE 50 UG/ML
25-50 INJECTION, SOLUTION INTRAMUSCULAR; INTRAVENOUS
Status: DISCONTINUED | OUTPATIENT
Start: 2020-03-05 | End: 2020-03-05 | Stop reason: HOSPADM

## 2020-03-05 RX ORDER — DEXTROSE MONOHYDRATE 25 G/50ML
25-50 INJECTION, SOLUTION INTRAVENOUS
Status: DISCONTINUED | OUTPATIENT
Start: 2020-03-05 | End: 2020-03-05 | Stop reason: HOSPADM

## 2020-03-05 RX ORDER — DEXAMETHASONE SODIUM PHOSPHATE 4 MG/ML
INJECTION, SOLUTION INTRA-ARTICULAR; INTRALESIONAL; INTRAMUSCULAR; INTRAVENOUS; SOFT TISSUE PRN
Status: DISCONTINUED | OUTPATIENT
Start: 2020-03-05 | End: 2020-03-05

## 2020-03-05 RX ORDER — OXYCODONE HYDROCHLORIDE 5 MG/1
5 TABLET ORAL ONCE
Status: COMPLETED | OUTPATIENT
Start: 2020-03-05 | End: 2020-03-05

## 2020-03-05 RX ORDER — ONDANSETRON 2 MG/ML
4 INJECTION INTRAMUSCULAR; INTRAVENOUS EVERY 30 MIN PRN
Status: DISCONTINUED | OUTPATIENT
Start: 2020-03-05 | End: 2020-03-05 | Stop reason: HOSPADM

## 2020-03-05 RX ORDER — LIDOCAINE 40 MG/G
CREAM TOPICAL
Status: DISCONTINUED | OUTPATIENT
Start: 2020-03-05 | End: 2020-03-05 | Stop reason: HOSPADM

## 2020-03-05 RX ORDER — VANCOMYCIN HYDROCHLORIDE 1 G/20ML
INJECTION, POWDER, LYOPHILIZED, FOR SOLUTION INTRAVENOUS PRN
Status: DISCONTINUED | OUTPATIENT
Start: 2020-03-05 | End: 2020-03-05 | Stop reason: HOSPADM

## 2020-03-05 RX ORDER — ONDANSETRON 4 MG/1
4 TABLET, ORALLY DISINTEGRATING ORAL EVERY 30 MIN PRN
Status: DISCONTINUED | OUTPATIENT
Start: 2020-03-05 | End: 2020-03-05 | Stop reason: HOSPADM

## 2020-03-05 RX ORDER — SODIUM CHLORIDE, SODIUM LACTATE, POTASSIUM CHLORIDE, CALCIUM CHLORIDE 600; 310; 30; 20 MG/100ML; MG/100ML; MG/100ML; MG/100ML
INJECTION, SOLUTION INTRAVENOUS CONTINUOUS
Status: DISCONTINUED | OUTPATIENT
Start: 2020-03-05 | End: 2020-03-05 | Stop reason: HOSPADM

## 2020-03-05 RX ORDER — HYDROMORPHONE HYDROCHLORIDE 1 MG/ML
.3-.5 INJECTION, SOLUTION INTRAMUSCULAR; INTRAVENOUS; SUBCUTANEOUS EVERY 10 MIN PRN
Status: DISCONTINUED | OUTPATIENT
Start: 2020-03-05 | End: 2020-03-05 | Stop reason: HOSPADM

## 2020-03-05 RX ORDER — MAGNESIUM HYDROXIDE 1200 MG/15ML
LIQUID ORAL PRN
Status: DISCONTINUED | OUTPATIENT
Start: 2020-03-05 | End: 2020-03-05 | Stop reason: HOSPADM

## 2020-03-05 RX ORDER — FENTANYL CITRATE 50 UG/ML
INJECTION, SOLUTION INTRAMUSCULAR; INTRAVENOUS PRN
Status: DISCONTINUED | OUTPATIENT
Start: 2020-03-05 | End: 2020-03-05

## 2020-03-05 RX ORDER — PROPOFOL 10 MG/ML
INJECTION, EMULSION INTRAVENOUS PRN
Status: DISCONTINUED | OUTPATIENT
Start: 2020-03-05 | End: 2020-03-05

## 2020-03-05 RX ORDER — FENTANYL CITRATE 50 UG/ML
50-100 INJECTION, SOLUTION INTRAMUSCULAR; INTRAVENOUS
Status: DISCONTINUED | OUTPATIENT
Start: 2020-03-05 | End: 2020-03-05 | Stop reason: HOSPADM

## 2020-03-05 RX ORDER — MEPERIDINE HYDROCHLORIDE 25 MG/ML
12.5 INJECTION INTRAMUSCULAR; INTRAVENOUS; SUBCUTANEOUS
Status: DISCONTINUED | OUTPATIENT
Start: 2020-03-05 | End: 2020-03-05 | Stop reason: HOSPADM

## 2020-03-05 RX ORDER — SODIUM CHLORIDE, SODIUM LACTATE, POTASSIUM CHLORIDE, CALCIUM CHLORIDE 600; 310; 30; 20 MG/100ML; MG/100ML; MG/100ML; MG/100ML
INJECTION, SOLUTION INTRAVENOUS CONTINUOUS PRN
Status: DISCONTINUED | OUTPATIENT
Start: 2020-03-05 | End: 2020-03-05

## 2020-03-05 RX ORDER — LIDOCAINE HYDROCHLORIDE 20 MG/ML
INJECTION, SOLUTION INFILTRATION; PERINEURAL PRN
Status: DISCONTINUED | OUTPATIENT
Start: 2020-03-05 | End: 2020-03-05

## 2020-03-05 RX ORDER — NICOTINE POLACRILEX 4 MG
15-30 LOZENGE BUCCAL
Status: DISCONTINUED | OUTPATIENT
Start: 2020-03-05 | End: 2020-03-05 | Stop reason: HOSPADM

## 2020-03-05 RX ORDER — VANCOMYCIN HYDROCHLORIDE 1 G/20ML
INJECTION, POWDER, LYOPHILIZED, FOR SOLUTION INTRAVENOUS
Status: DISCONTINUED
Start: 2020-03-05 | End: 2020-03-05 | Stop reason: HOSPADM

## 2020-03-05 RX ORDER — NALOXONE HYDROCHLORIDE 0.4 MG/ML
.1-.4 INJECTION, SOLUTION INTRAMUSCULAR; INTRAVENOUS; SUBCUTANEOUS
Status: DISCONTINUED | OUTPATIENT
Start: 2020-03-05 | End: 2020-03-05 | Stop reason: HOSPADM

## 2020-03-05 RX ORDER — CLINDAMYCIN PHOSPHATE 900 MG/50ML
900 INJECTION, SOLUTION INTRAVENOUS
Status: COMPLETED | OUTPATIENT
Start: 2020-03-05 | End: 2020-03-05

## 2020-03-05 RX ADMIN — CLINDAMYCIN PHOSPHATE 900 MG: 900 INJECTION, SOLUTION INTRAVENOUS at 08:00

## 2020-03-05 RX ADMIN — FENTANYL CITRATE 50 MCG: 0.05 INJECTION, SOLUTION INTRAMUSCULAR; INTRAVENOUS at 09:48

## 2020-03-05 RX ADMIN — LIDOCAINE HYDROCHLORIDE 100 MG: 20 INJECTION, SOLUTION INFILTRATION; PERINEURAL at 07:45

## 2020-03-05 RX ADMIN — FENTANYL CITRATE 100 MCG: 50 INJECTION, SOLUTION INTRAMUSCULAR; INTRAVENOUS at 07:44

## 2020-03-05 RX ADMIN — DEXAMETHASONE SODIUM PHOSPHATE 4 MG: 4 INJECTION, SOLUTION INTRA-ARTICULAR; INTRALESIONAL; INTRAMUSCULAR; INTRAVENOUS; SOFT TISSUE at 08:03

## 2020-03-05 RX ADMIN — MIDAZOLAM HYDROCHLORIDE 2 MG: 1 INJECTION, SOLUTION INTRAMUSCULAR; INTRAVENOUS at 10:05

## 2020-03-05 RX ADMIN — SODIUM CHLORIDE, POTASSIUM CHLORIDE, SODIUM LACTATE AND CALCIUM CHLORIDE: 600; 310; 30; 20 INJECTION, SOLUTION INTRAVENOUS at 07:38

## 2020-03-05 RX ADMIN — OXYCODONE HYDROCHLORIDE 5 MG: 5 TABLET ORAL at 10:50

## 2020-03-05 RX ADMIN — SODIUM CHLORIDE, POTASSIUM CHLORIDE, SODIUM LACTATE AND CALCIUM CHLORIDE: 600; 310; 30; 20 INJECTION, SOLUTION INTRAVENOUS at 10:12

## 2020-03-05 RX ADMIN — ONDANSETRON 4 MG: 2 INJECTION INTRAMUSCULAR; INTRAVENOUS at 09:15

## 2020-03-05 RX ADMIN — SUCCINYLCHOLINE CHLORIDE 100 MG: 20 INJECTION, SOLUTION INTRAMUSCULAR; INTRAVENOUS; PARENTERAL at 07:45

## 2020-03-05 RX ADMIN — PROPOFOL 200 MG: 10 INJECTION, EMULSION INTRAVENOUS at 07:45

## 2020-03-05 RX ADMIN — MIDAZOLAM 1 MG: 1 INJECTION INTRAMUSCULAR; INTRAVENOUS at 07:45

## 2020-03-05 ASSESSMENT — ENCOUNTER SYMPTOMS
DYSRHYTHMIAS: 0
SEIZURES: 0

## 2020-03-05 ASSESSMENT — MIFFLIN-ST. JEOR: SCORE: 1581.43

## 2020-03-05 ASSESSMENT — COPD QUESTIONNAIRES: COPD: 1

## 2020-03-05 ASSESSMENT — LIFESTYLE VARIABLES: TOBACCO_USE: 1

## 2020-03-05 NOTE — PROGRESS NOTES
Medication History Completed by Medication Scribe  Admission medication history interview status for the 3/5/2020  admission is complete. See EPIC admission navigator for prior to admission medications     Medication history sources: Patient, Surescripts and H&P  Medication history source reliability: Good  Adherence assessment: N/A Not Observed    Significant changes made to the medication list:  None      Additional medication history information:   None    Medication reconciliation completed by provider prior to medication history? No    Time spent in this activity: 35 minutes      Prior to Admission medications    Medication Sig Last Dose Taking? Auth Provider   acetaminophen (TYLENOL) 500 MG tablet Take 500-1,000 mg by mouth every 6 hours as needed for mild pain more than a week at prn Yes Reported, Patient   albuterol (2.5 MG/3ML) 0.083% neb solution Take 2.5 mg by nebulization every 6 hours as needed for shortness of breath / dyspnea or wheezing more than a week at prn Yes Reported, Patient   albuterol (PROAIR HFA/PROVENTIL HFA/VENTOLIN HFA) 108 (90 Base) MCG/ACT inhaler Inhale 2 puffs into the lungs every 4 hours as needed for shortness of breath / dyspnea or wheezing 3/5/2020 at 0430 Yes Reported, Patient   ALPRAZolam (XANAX) 0.5 MG tablet Take 0.25-0.5 mg by mouth daily as needed for anxiety  more than a week at prn Yes Reported, Patient   azelastine (ASTELIN) 0.1 % nasal spray Spray 1 spray into both nostrils 2 times daily 3/4/2020 at pm Yes Reported, Patient   azithromycin (ZITHROMAX) 250 MG tablet Take 250 mg by mouth three times a week 3/4/2020 at pm Yes Reported, Patient   benzonatate (TESSALON) 200 MG capsule Take 200 mg by mouth 3 times daily as needed for cough 3/5/2020 at 0430 Yes Reported, Patient   bimatoprost (LUMIGAN) 0.01 % SOLN Place 1 drop into both eyes At Bedtime  3/4/2020 at pm Yes Reported, Patient   bumetanide (BUMEX) 1 MG tablet Take 1 mg by mouth daily as needed for edema  3/4/2020  at prn Yes Reported, Patient   CALCIUM PO Take 1,200-1,500 mg by mouth 3 times daily 3/4/2020 at pm Yes Reported, Patient   Carboxymethylcellul-Glycerin (REFRESH OPTIVE) 1-0.9 % GEL Apply to eye 3 times daily as needed 3/5/2020 at 0430 Yes Reported, Patient   cetirizine (ZYRTEC) 10 MG tablet Take 10 mg by mouth daily 3/4/2020 at pm Yes Reported, Patient   Coenzyme Q10 (COQ-10 PO) Take 1 tablet by mouth daily 3/2/2020 Yes Reported, Patient   Cyanocobalamin (B-12 SL) Place 5,000 mcg under the tongue daily  3/4/2020 at am Yes Reported, Patient   cycloSPORINE (RESTASIS) 0.05 % ophthalmic emulsion Place 1 drop into both eyes 4 times daily as needed  3/5/2020 at prn Yes Reported, Patient   diazepam (VALIUM) 5 MG tablet Take 5-7.5 mg by mouth every 6 hours as needed for anxiety  Yes Reported, Patient   DOCUSATE CALCIUM PO Take 300 mg by mouth At Bedtime (3 x 100 mg = 300 mg dose) 3/4/2020 at pm Yes Reported, Patient   DULoxetine (CYMBALTA) 60 MG capsule Take 60 mg by mouth 2 times daily  3/4/2020 at pm Yes Reported, Patient   estradiol (ESTRACE) 0.1 MG/GM cream Place 2 g vaginally daily as needed  more than a week at prn Yes Reported, Patient   eszopiclone (LUNESTA) 1 MG TABS Take 0.5-1 mg by mouth At Bedtime  3/3/2020 Yes Reported, Patient   ferrous fumarate 65 mg, Lummi. FE,-Vitamin C 125 mg (VITRON C)  MG TABS tablet Take 1 tablet by mouth daily 3/4/2020 at am Yes Reported, Patient   fluconazole (DIFLUCAN) 200 MG tablet Take 200 mg by mouth daily as needed (When taking an antibiotic to prevent Thrush)  more than a week at prn Yes Reported, Patient   fluorometholone (FML LIQUIFILM) 0.1 % ophthalmic susp Place 1 drop into both eyes every other day  3/5/2020 at 0430 Yes Reported, Patient   fluticasone (FLONASE) 50 MCG/ACT nasal spray Spray 1 spray into both nostrils 2 times daily  3/5/2020 at 0430 Yes Reported, Patient   fluticasone-salmeterol (ADVAIR) 250-50 MCG/DOSE diskus inhaler Inhale 1 puff into the lungs 2  times daily 3/4/2020 at pm Yes Reported, Patient   glucosamine-chondroitinoitin 7865-1957 MG/30ML LIQD Take 15 mLs by mouth daily 3/4/2020 at pm Yes Reported, Patient   guaiFENesin-dextromethorphan (ROBITUSSIN DM) 100-10 MG/5ML syrup Take 10 mLs by mouth 2 times daily as needed for cough 3/5/2020 at prn Yes Reported, Patient   hydrocortisone (ANUSOL-HC) 2.5 % cream Place rectally 2 times daily as needed  more than a week at prn Yes Reported, Patient   hydrOXYzine (VISTARIL) 25 MG capsule Take 1 capsule (25 mg) by mouth 3 times daily as needed for itching (spasm)  Patient taking differently: Take 25 mg by mouth 3 times daily as needed (Pain)  3/4/2020 at prn Yes Abdon Barahona MD   ipratropium - albuterol 0.5 mg/2.5 mg/3 mL (DUONEB) 0.5-2.5 (3) MG/3ML neb solution Take 1 vial by nebulization every 6 hours as needed for shortness of breath / dyspnea or wheezing more than a week at prn Yes Reported, Patient   ketoconazole (NIZORAL) 2 % cream Apply topically daily as needed for itching  more than a week at prn Yes Reported, Patient   lamoTRIgine (LAMICTAL) 150 MG tablet Take 300 mg by mouth daily (Takes 2 x 150mg tablet = 300mg dose)  3/4/2020 at am Yes Reported, Patient   levothyroxine (SYNTHROID) 125 MCG tablet Take 125 mcg by mouth daily  3/5/2020 at 0430 Yes Reported, Patient   lidocaine (ASPERCREME W/LIDOCAINE) 4 % CREA cream Apply topically 3 times daily as needed for mild pain 3/2/2020 at prn Yes Reported, Patient   medical cannabis (Patient's own supply) Inhale 1 Dose into the lungs daily as needed THC/CBD Vaporizer  Filled by [facility]    (The purpose of this order is to document that the patient reports taking medical cannabis.  This is not a prescription, and is not used to certify that the patient has a qualifying medical condition.) more than a week at prn Yes Reported, Patient   medical cannabis (Patient's own supply) by Other route daily as needed Topical Cannabis Bar  Filled by [facility]    (The purpose of this order is to document that the patient reports taking medical cannabis.  This is not a prescription, and is not used to certify that the patient has a qualifying medical condition.) 3/4/2020 at pm Yes Reported, Patient   medical cannabis (Patient's own supply) Take 1 Dose by mouth daily as needed Oral Capsules - Yellow  Filled by [facility]    (The purpose of this order is to document that the patient reports taking medical cannabis.  This is not a prescription, and is not used to certify that the patient has a qualifying medical condition.) 3/5/2020 at 0430 Yes Reported, Patient   medical cannabis (Patient's own supply) Take 1 Dose by mouth daily as needed Oral Capsules - Red  Filled by [facility]    (The purpose of this order is to document that the patient reports taking medical cannabis.  This is not a prescription, and is not used to certify that the patient has a qualifying medical condition.) 3/5/2020 at 0430 Yes Reported, Patient   metaxalone (SKELAXIN) 800 MG tablet Take 800 mg by mouth 3 times daily  3/5/2020 at 0430 Yes Reported, Patient   metoclopramide (REGLAN) 10 MG tablet Take 10 mg by mouth every 6 hours as needed  more than a week at prn Yes Reported, Patient   montelukast (SINGULAIR) 10 MG tablet Take 10 mg by mouth daily  3/4/2020 at pm Yes Reported, Patient   mupirocin (BACTROBAN) 2 % ointment Apply topically 3 times daily as needed  3/2/2020 at prn Yes Reported, Patient   Nitroglycerin (NITROSTAT SL) Place 0.4 mg under the tongue every 5 minutes as needed for other (sphincter of oddi spasms)  more than a week at prn Yes Reported, Patient   nystatin (NYSTOP) 869743 UNIT/GM POWD Apply topically 2 times daily as needed 3/4/2020 at prn Yes Reported, Patient   nystatin-triamcinolone (MYCOLOG II) cream Apply topically 2 times daily as needed  3/4/2020 at prn Yes Reported, Patient   Omega-3 Fatty Acids (FISH OIL PO) Take 2 capsules by mouth 4 times daily  more than a week  Yes Reported, Patient   Pediatric Multivitamins-Iron (FLINTSTONES PLUS IRON PO) Take 2 tablets by mouth daily more than a week Yes Reported, Patient   Polyethylene Glycol 3350 (MIRALAX PO) Take 17 g by mouth daily as needed  3/2/2020 at prn Yes Reported, Patient   Potassium Chloride Ailyn ER (K-DUR PO) Take 10 mEq by mouth daily as needed IF TAKING BUMEX  3/4/2020 at prn Yes Reported, Patient   pregabalin (LYRICA) 150 MG capsule Take 150 mg by mouth 3 times daily  3/5/2020 at 0430 Yes Reported, Patient   ranitidine (ZANTAC) 150 MG tablet Take 150 mg by mouth 2 times daily  3/5/2020 at 0430 Yes Reported, Patient   traZODone (DESYREL) 50 MG tablet Take  mg by mouth At Bedtime  3/4/2020 at pm Yes Reported, Patient   VITAMIN D, CHOLECALCIFEROL, PO Take 5,000 Units by mouth daily 3/4/2020 at am Yes Reported, Patient   meloxicam (MOBIC) 7.5 MG tablet Take 7.5 mg by mouth daily as needed more than a week at prn  Reported, Patient

## 2020-03-05 NOTE — DISCHARGE INSTRUCTIONS
Same Day Surgery Discharge Instructions for  Sedation and General Anesthesia       It's not unusual to feel dizzy, light-headed or faint for up to 24 hours after surgery or while taking pain medication.  If you have these symptoms: sit for a few minutes before standing and have someone assist you when you get up to walk or use the bathroom.      You should rest and relax for the next 24 hours. We recommend you make arrangements to have an adult stay with you for at least 24 hours after your discharge.  Avoid hazardous and strenuous activity.      DO NOT DRIVE any vehicle or operate mechanical equipment for 24 hours following the end of your surgery.  Even though you may feel normal, your reactions may be affected by the medication you have received.      Do not drink alcoholic beverages for 24 hours following surgery.       Slowly progress to your regular diet as you feel able. It's not unusual to feel nauseated and/or vomit after receiving anesthesia.  If you develop these symptoms, drink clear liquids (apple juice, ginger ale, broth, 7-up, etc. ) until you feel better.  If your nausea and vomiting persists for 24 hours, please notify your surgeon.        All narcotic pain medications, along with inactivity and anesthesia, can cause constipation. Drinking plenty of liquids and increasing fiber intake will help.      For any questions of a medical nature, call your surgeon.      Do not make important decisions for 24 hours.      If you had general anesthesia, you may have a sore throat for a couple of days related to the breathing tube used during surgery.  You may use Cepacol lozenges to help with this discomfort.  If it worsens or if you develop a fever, contact your surgeon.       If you feel your pain is not well managed with the pain medications prescribed by your surgeon, please contact your surgeon's office to let them know so they can address your concerns.       Intrathecal Pump Implant Discharge  Instructions     After Surgery:     Resume light activity as tolerated    Resume your regular pain medication regimen    Begin taking your oral antibiotic the day after your surgery.  It is important you finish the entire course of medication until gone.    Site Care:    You may remove island dressing in 48 hours and shower at that time.    Incisions have been closed with dissolvable stitches.  Allow steri strips to fall off on their own.      After the bandages are removed, check your incision sites daily.  Keep sites clean and dry.    During the first few days, you may notice some swelling or discoloration around the incision sites, which is normal. However, if the fluid is foul smelling, thick, or does not decrease in amount, call [facility].    DO NOT shower until your bandages have been removed.    NO soaking your incisions for 2 weeks.  This includes baths, whirlpools, swimming pools, and hot tubs.    Call [facility] during business hours or the on-call call provider after hours if you develop any of the following:    Signs of infection such as: fevers, chills, increased pain, drainage (as mentioned above), redness, and swelling from your incision sites.    Headache persisting for more than 48 hours    Unusual changes in or stopping of sensation in your legs or back.     Uncontrolled pain,     Signs or symptoms of a deep vein thrombosis (DVT) : swelling in one or both legs, pain or tenderness in one or both legs, warm skin on your leg, and/or red or discolored skin on your leg    [facility]  [phone]    After hours on-call provider     Emergency Situations go to the Emergency Department or Call 911:    Sudden severe back pain    Sudden onset of leg weakness and spasms    Loss of bladder control and/or bowel function    Signs or symptoms or a pulmonary embolism (PE): sudden coughing --which may bring up blood, sharp chest pain, rapid breathing or shortness  of breath, and/or severe light headedness       **If you have questions or concerns about your procedure,   call Dr. Stevenson at **

## 2020-03-05 NOTE — ANESTHESIA CARE TRANSFER NOTE
Patient: Violeta Maria    Procedure(s):  INTRATHECAL PUMP IMPLANT    Diagnosis: Low back pain [M54.5]  Diagnosis Additional Information: No value filed.    Anesthesia Type:   General, ETT     Note:  Airway :Face Mask  Patient transferred to:PACU  Comments: Transferred to PACU, spontaneous respirations, 10L oxygen via facemask.  All monitors and alarms on and functioning, VSS.  Patient awake, comfortable.  Report to PACU RN.Handoff Report: Identifed the Patient, Identified the Reponsible Provider, Reviewed the pertinent medical history, Discussed the surgical course, Reviewed Intra-OP anesthesia mangement and issues during anesthesia, Set expectations for post-procedure period and Allowed opportunity for questions and acknowledgement of understanding      Vitals: (Last set prior to Anesthesia Care Transfer)    CRNA VITALS  3/5/2020 0857 - 3/5/2020 0940      3/5/2020             NIBP:  160/87    NIBP Mean:  95                Electronically Signed By: DAPHNE Camargo CRNA  March 5, 2020  9:40 AM

## 2020-03-05 NOTE — ANESTHESIA PREPROCEDURE EVALUATION
Anesthesia Pre-Procedure Evaluation    Patient: Violeta Maria   MRN: [id number] : 1955          Preoperative Diagnosis: Low back pain [M54.5]    Procedure(s):  INTRATHECAL PUMP IMPLANT    Past Medical History:   Diagnosis Date     Acquired hypothyroidism      Allergic rhinitis      Anemia      Arthritis      Bronchitis      Chronic constipation      Chronic pain syndrome      Clonus     foot     COPD (chronic obstructive pulmonary disease) (H)     chronic cough     Depression with anxiety      DJD (degenerative joint disease)      Dysphagia      Fibromyalgia      GERD (gastroesophageal reflux disease)      Glaucoma      Heart disease     PVCs      IBS (irritable bowel syndrome)      IgG deficiency (H)      Irregular heart beat     PVCs     Lymphedema      Memory loss      MMT (medial meniscus tear)     RIGHT     Morbid obesity (H)      DANIELITO on CPAP      Osteopenia      Other chronic pain      Pain medication agreement      Polyarthritis      Primary insomnia      Psoriasis      PVD (peripheral vascular disease) (H)      Recurrent pancreatitis      Uncomplicated asthma     Mild/moderate      Past Surgical History:   Procedure Laterality Date     C TOTAL KNEE ARTHROPLASTY Left 2013    DML     C TOTAL KNEE ARTHROPLASTY Right 07/15/2015    DML     CHOLECYSTECTOMY       COLONOSCOPY       ENT SURGERY      TONSILLECTOMY     EYE SURGERY      cataracts     GASTRIC BYPASS           GENITOURINARY SURGERY      CYSTOURETHROSCOPY     GI SURGERY      EGD, GASTROSTOMY TUBE/ERCP     GYN SURGERY      , D&C, HYSTEROSCOPY, POLYPECTOMY     HC KNEE SCOPE,SINGLE MENISECTOMY  12    Right, BOTH MEDIAL AND LATERAL MENISECTOMIES     INSERT CATHETER INTRATHECAL (TRIAL) N/A 2019    Procedure: INTRATHECAL PUMP TRIAL (MEDTRONIC);  Surgeon: Chaitanya Stevenson MD;  Location: [facility] OR     INSERT STIMULATOR AND LEADS INTERNAL DORSAL COLUMN N/A 10/18/2018    Procedure: SPINAL CORD STIMULATOR IMPLANT;  Surgeon:  Caty Serra DO;  Location: [facility]     PARTIAL SECOND RAY RESECTION LEFT FOOT         Anesthesia Evaluation     . Pt has had prior anesthetic. Type: General and MAC    No history of anesthetic complications          ROS/MED HX    ENT/Pulmonary:     (+)sleep apnea, allergic rhinitis, tobacco use, COPD, uses CPAP , . .   (-) asthma   Neurologic:      (-) seizures, CVA and migraines   Cardiovascular:     (+) -Peripheral Vascular Disease---. : . . . :. Irregular Heartbeat/Palpitations, .      (-) hypertension, CAD, BRYSON, arrhythmias and dyslipidemia   METS/Exercise Tolerance:  >4 METS   Hematologic:     (+) Anemia, -      Musculoskeletal:   (+) arthritis,  other musculoskeletal- osteoporosis, lymphedema      GI/Hepatic:     (+) GERD Other GI/Hepatic sphincter of oddi dysfunction causing recurrent pancreatitis     (-) liver disease   Renal/Genitourinary:      (-) renal disease and nephrolithiasis   Endo:     (+) thyroid problem hypothyroidism, Obesity, .   (-) Type I DM and Type II DM   Psychiatric:     (+) psychiatric history anxiety and depression      Infectious Disease:        (-) Recent Fever   Malignancy:         Other: Comment: Fibromyalgia   (+) H/O Chronic Pain,H/O chronic opiod use ,                         Physical Exam  Normal systems: cardiovascular and pulmonary    Airway   Mallampati: II  TM distance: >3 FB  Neck ROM: full    Dental   (+) upper dentures and lower dentures    Cardiovascular       Pulmonary             Lab Results   Component Value Date    CRP 12.1 (H) 08/06/2015    SED 28 08/06/2015    POTASSIUM 4.3 06/30/2015    CR 0.92 06/30/2015    GLC 94 06/30/2015       Preop Vitals  BP Readings from Last 3 Encounters:   11/21/19 (!) 145/88   06/27/19 134/82   02/21/19 160/83    Pulse Readings from Last 3 Encounters:   11/21/19 84   06/27/19 75   02/21/19 78      Resp Readings from Last 3 Encounters:   11/21/19 16   10/18/18 16   03/27/17 18    SpO2 Readings from Last 3 Encounters:   11/21/19 98%  "  06/27/19 96%   02/21/19 97%      Temp Readings from Last 1 Encounters:   11/21/19 36.1  C (97  F) (Temporal)    Ht Readings from Last 1 Encounters:   11/21/19 1.626 m (5' 4\")      Wt Readings from Last 1 Encounters:   11/21/19 97.8 kg (215 lb 8 oz)    Estimated body mass index is 36.99 kg/m  as calculated from the following:    Height as of 11/21/19: 1.626 m (5' 4\").    Weight as of 11/21/19: 97.8 kg (215 lb 8 oz).       Anesthesia Plan      History & Physical Review  History and physical reviewed and following examination; no interval change.    ASA Status:  3 .    NPO Status:  > 8 hours    Plan for General and ETT with Intravenous induction. Maintenance will be Balanced.    PONV prophylaxis:  Ondansetron (or other 5HT-3) and Dexamethasone or Solumedrol  6.5 mm ETT      Postoperative Care  Postoperative pain management:  IV analgesics.  Plan for postoperative opioid use.    Consents  Anesthetic plan, risks, benefits and alternatives discussed with:  Patient..                 Alf Guerra MD  "

## 2020-03-05 NOTE — ANESTHESIA POSTPROCEDURE EVALUATION
Patient: Violeta Maria    Procedure(s):  INTRATHECAL PUMP IMPLANT    Diagnosis:Low back pain [M54.5]  Diagnosis Additional Information: No value filed.    Anesthesia Type:  General, ETT    Note:  Anesthesia Post Evaluation    Patient location during evaluation: PACU  Patient participation: Able to fully participate in evaluation  Level of consciousness: awake and alert  Pain management: adequate  Airway patency: patent  Cardiovascular status: acceptable  Respiratory status: acceptable  Hydration status: acceptable  PONV: none     Anesthetic complications: None          Last vitals:  Vitals:    03/05/20 1045 03/05/20 1100 03/05/20 1158   BP: 129/68 121/70 123/79   Pulse: 76     Resp: 8 14 18   Temp:      SpO2: 94% 93% 93%         Electronically Signed By: Alf Guerra MD  March 5, 2020  3:37 PM

## 2020-03-05 NOTE — PROCEDURES
Name:                         Violeta Maria  :                           1955  Location:                    [facility]  Procedure date:        2020  Procedure:                 Intrathecal Pump Implant  Indication:                  Chronic pain syndrome G89.4     Anesthesia: MAC and local.     Implants: All implants were [employer] products. The catheter lot number was [id number]. The SynchroMed II pump was model number 8637-20 and serial number [id number].      Description of Procedure: The procedure, indications, risks and alternatives to therapy were discussed with the patient. Written informed consent was obtained. The pocket site and insertion site were marked in the preoperative area. The patient was brought into the operating room and positioned prone on the operating table, taking care to relieve all pressure points and placed extremities in a comfortable position. Chaitanya Stevenson MD then initiated a surgical timeout stating the patient's name, date of birth, allergies, and the procedure to be performed. General anesthesia was induced.      The operative field was prepped and draped in normal sterile fashion. The skin was anesthetized with Xylocaine at the appropriate level as identified fluoroscopically. A small left paramedian incision was made and a 16 gauge Touhy needle was inserted into the intrathecal space under fluoroscopic guidance through the L2-3 interlaminar space with efflux of CSF. The catheter was then passed to the upper T10 level. The needle and stylet were withdrawn as 1 piece. The catheter position was then confirmed at the upper T10 vertebral level. The catheter was secured in the back fascia with 2-0 silk sutures and a bi-winged anchor.      The skin was anesthetized with Xylocaine with epinephrine and an incision was made in the left buttock. The pump was primed. The sterile saline was removed and filled with Preservative-free Saline 1.0 mg/mL. A  tunneler was passed from the back incision to the pump incision and the catheter was passed forward. The catheter was connected to the pump, which was set with a priming bolus of 0.293 mL over 18 minutes. The starting dose was programmed to 0.0064 mg per day. The pump was placed into the left buttock pocket. It was turned medially and the additional catheter material was coiled behind the pump. The wound was irrigated copiously with saline solution and hemostasis was obtained. Vancomycin powder was dispersed between the two incision sites. The subcutaneous tissue was closed with Vircyl suture. Sterile dressings were applied and the patient was transferred to the recovery room in stable condition.      Chaitanya Stevenson MD

## 2020-03-06 NOTE — OR NURSING
"Post op phone cll- pain control \"OK but only barely\"- pain is 6-7/10 one hour post medications-instructed pt to take her atarax, along with tylenol, ibuprofen, oxycodone and icing to site if this does not give better pain control then should call Dr uCriel office. Verbalized understanding, denies questions, agrees to this plan.  "

## 2020-11-29 ENCOUNTER — HEALTH MAINTENANCE LETTER (OUTPATIENT)
Age: 65
End: 2020-11-29

## 2021-04-10 ENCOUNTER — HEALTH MAINTENANCE LETTER (OUTPATIENT)
Age: 66
End: 2021-04-10

## 2021-09-19 ENCOUNTER — HEALTH MAINTENANCE LETTER (OUTPATIENT)
Age: 66
End: 2021-09-19

## 2022-05-01 ENCOUNTER — HEALTH MAINTENANCE LETTER (OUTPATIENT)
Age: 67
End: 2022-05-01

## 2022-11-21 ENCOUNTER — HEALTH MAINTENANCE LETTER (OUTPATIENT)
Age: 67
End: 2022-11-21

## 2023-06-02 ENCOUNTER — HEALTH MAINTENANCE LETTER (OUTPATIENT)
Age: 68
End: 2023-06-02

## 2023-07-16 ENCOUNTER — OFFICE VISIT (OUTPATIENT)
Dept: URGENT CARE | Facility: URGENT CARE | Age: 68
End: 2023-07-16
Payer: COMMERCIAL

## 2023-07-16 VITALS
TEMPERATURE: 98.1 F | SYSTOLIC BLOOD PRESSURE: 113 MMHG | HEART RATE: 73 BPM | BODY MASS INDEX: 26.37 KG/M2 | WEIGHT: 153.6 LBS | DIASTOLIC BLOOD PRESSURE: 68 MMHG | OXYGEN SATURATION: 94 %

## 2023-07-16 DIAGNOSIS — J20.9 ACUTE BRONCHITIS WITH COEXISTING CONDITION REQUIRING PROPHYLACTIC TREATMENT: Primary | ICD-10-CM

## 2023-07-16 DIAGNOSIS — J44.9 CHRONIC OBSTRUCTIVE PULMONARY DISEASE, UNSPECIFIED COPD TYPE (H): ICD-10-CM

## 2023-07-16 PROCEDURE — 99204 OFFICE O/P NEW MOD 45 MIN: CPT | Performed by: FAMILY MEDICINE

## 2023-07-16 RX ORDER — HYDROCORTISONE 25 MG/G
1 CREAM TOPICAL 3 TIMES DAILY
COMMUNITY
Start: 2023-01-06

## 2023-07-16 RX ORDER — DOXYCYCLINE 100 MG/1
100 CAPSULE ORAL 2 TIMES DAILY
Qty: 14 CAPSULE | Refills: 0 | Status: SHIPPED | OUTPATIENT
Start: 2023-07-16 | End: 2023-07-16

## 2023-07-16 RX ORDER — PHENOL 1.4 %
0.5 AEROSOL, SPRAY (ML) MUCOUS MEMBRANE AT BEDTIME
COMMUNITY
Start: 2022-09-14

## 2023-07-16 RX ORDER — PREDNISONE 5 MG/1
1 TABLET ORAL
COMMUNITY
Start: 2023-06-06

## 2023-07-16 RX ORDER — DILTIAZEM HYDROCHLORIDE 180 MG/1
1 CAPSULE, EXTENDED RELEASE ORAL DAILY
COMMUNITY
Start: 2023-05-06

## 2023-07-16 RX ORDER — FLECAINIDE ACETATE 50 MG/1
50 TABLET ORAL EVERY 12 HOURS
COMMUNITY
Start: 2023-03-29

## 2023-07-16 RX ORDER — LEFLUNOMIDE 20 MG/1
20 TABLET ORAL DAILY
COMMUNITY
Start: 2022-10-23

## 2023-07-16 RX ORDER — DOXYCYCLINE 100 MG/1
100 CAPSULE ORAL 2 TIMES DAILY
Qty: 14 CAPSULE | Refills: 0 | Status: SHIPPED | OUTPATIENT
Start: 2023-07-16

## 2023-07-16 RX ORDER — IPRATROPIUM BROMIDE AND ALBUTEROL SULFATE 2.5; .5 MG/3ML; MG/3ML
1 SOLUTION RESPIRATORY (INHALATION) EVERY 6 HOURS PRN
Qty: 30 ML | Refills: 1 | Status: SHIPPED | OUTPATIENT
Start: 2023-07-16

## 2023-07-16 RX ORDER — CLOTRIMAZOLE 1 %
1 CREAM (GRAM) TOPICAL DAILY PRN
COMMUNITY

## 2023-07-16 RX ORDER — PANTOPRAZOLE SODIUM 40 MG/1
40 TABLET, DELAYED RELEASE ORAL DAILY
COMMUNITY
Start: 2023-02-14

## 2023-07-16 RX ORDER — BACLOFEN 5 MG/1
1-2 TABLET ORAL 3 TIMES DAILY PRN
COMMUNITY
Start: 2023-04-10

## 2023-07-16 RX ORDER — CELECOXIB 100 MG/1
100 CAPSULE ORAL 2 TIMES DAILY
COMMUNITY

## 2023-07-16 RX ORDER — TRAMADOL HYDROCHLORIDE 50 MG/1
100 TABLET ORAL DAILY
COMMUNITY

## 2023-07-16 NOTE — PROGRESS NOTES
Assessment & Plan     Acute bronchitis with coexisting condition requiring prophylactic treatment  Differentials discussed in detail.  Symptoms likely secondary to acute bronchitis.  Past medical history significant for multiple comorbidities.  Physical examination overall unremarkable.  Doxycycline prescribed, common side effects discussed.  Recommended to hold off prophylactic azithromycin for 1 week.  Continue well hydration, warm fluids, over-the-counter antitussive and follow-up if symptoms persist or worsen  - doxycycline hyclate (VIBRAMYCIN) 100 MG capsule; Take 1 capsule (100 mg) by mouth 2 times daily    Chronic obstructive pulmonary disease, unspecified COPD type (H)  - ipratropium - albuterol 0.5 mg/2.5 mg/3 mL (DUONEB) 0.5-2.5 (3) MG/3ML neb solution; Take 1 vial (3 mLs) by nebulization every 6 hours as needed for shortness of breath or wheezing      Eliecer Laughlin MD  [facility]   Violeta is a 68 year old, presenting for the following health issues:  Respiratory Problems (Covid test 2 days ago -negative./Productive cough for the last week, started with clear phlegm - now yellow. Sx started a week ago and now worsening. )      HPI     Concern -   Onset: 1 week  Description: cough, chest conestion  Intensity: moderate  Progression of Symptoms:  worsening  Accompanying Signs & Symptoms: fever  Previous history of similar problem: bronchitis  Therapies tried and outcome: tessalon, albuterol, robitussin    COVID-19 test was negative        Review of Systems   Constitutional, HEENT, cardiovascular, pulmonary, gi and gu systems are negative, except as otherwise noted.        Objective    /68 (BP Location: Right arm, Cuff Size: Adult Small)   Pulse 73   Temp 98.1  F (36.7  C) (Tympanic)   Wt 69.7 kg (153 lb 9.6 oz)   LMP 04/17/2010   SpO2 94%   BMI 26.37 kg/m    Body mass index is 26.37 kg/m .  Physical Exam   GENERAL: alert and no distress  EYES: Eyes  grossly normal to inspection, PERRL and conjunctivae and sclerae normal  HENT: normal cephalic/atraumatic, nose and mouth without ulcers or lesions, oropharynx clear and oral mucous membranes moist  NECK: no adenopathy, no asymmetry, masses, or scars and thyroid normal to palpation  RESP: lungs clear to auscultation - no rales, rhonchi or wheezes  CV: regular rates and rhythm, normal S1 S2, no S3 or S4 and no murmur, click or rub  MS: chronic arthritis changes involving multiple joints   NEURO: Normal strength and tone, mentation intact and speech normal  PSYCH: mentation appears normal, affect normal/bright

## 2023-09-17 ENCOUNTER — HEALTH MAINTENANCE LETTER (OUTPATIENT)
Age: 68
End: 2023-09-17

## 2024-09-20 ENCOUNTER — OFFICE VISIT (OUTPATIENT)
Dept: URGENT CARE | Facility: URGENT CARE | Age: 69
End: 2024-09-20
Payer: COMMERCIAL

## 2024-09-20 VITALS
TEMPERATURE: 97.9 F | BODY MASS INDEX: 23.34 KG/M2 | SYSTOLIC BLOOD PRESSURE: 130 MMHG | RESPIRATION RATE: 18 BRPM | DIASTOLIC BLOOD PRESSURE: 71 MMHG | WEIGHT: 136 LBS | OXYGEN SATURATION: 98 % | HEART RATE: 62 BPM

## 2024-09-20 DIAGNOSIS — R05.1 ACUTE COUGH: Primary | ICD-10-CM

## 2024-09-20 DIAGNOSIS — J01.90 ACUTE NON-RECURRENT SINUSITIS, UNSPECIFIED LOCATION: ICD-10-CM

## 2024-09-20 DIAGNOSIS — D84.9 IMMUNOSUPPRESSION (H): ICD-10-CM

## 2024-09-20 PROCEDURE — 99203 OFFICE O/P NEW LOW 30 MIN: CPT | Performed by: PHYSICIAN ASSISTANT

## 2024-09-20 RX ORDER — DILTIAZEM HYDROCHLORIDE 120 MG/1
120 CAPSULE, COATED, EXTENDED RELEASE ORAL DAILY
COMMUNITY
Start: 2024-08-16

## 2024-09-20 RX ORDER — DOXYCYCLINE HYCLATE 100 MG
100 TABLET ORAL 2 TIMES DAILY
Qty: 14 TABLET | Refills: 0 | Status: SHIPPED | OUTPATIENT
Start: 2024-09-20 | End: 2024-09-27

## 2024-09-20 RX ORDER — FERROUS SULFATE 325(65) MG
325 TABLET, DELAYED RELEASE (ENTERIC COATED) ORAL
COMMUNITY
Start: 2023-08-09

## 2024-09-20 RX ORDER — CARVEDILOL 3.12 MG/1
3.12 TABLET ORAL
COMMUNITY
Start: 2024-05-20

## 2024-09-20 RX ORDER — SELENIUM 50 MCG
1 TABLET ORAL DAILY
COMMUNITY

## 2024-09-20 RX ORDER — FAMOTIDINE 20 MG/1
20 TABLET, FILM COATED ORAL
COMMUNITY
Start: 2024-03-28

## 2024-09-20 RX ORDER — FOLIC ACID 1 MG/1
1 TABLET ORAL
COMMUNITY
Start: 2022-10-28

## 2024-09-20 RX ORDER — INHALER, ASSIST DEVICES
SPACER (EA) MISCELLANEOUS
COMMUNITY
Start: 2024-09-04

## 2024-09-20 RX ORDER — METHOCARBAMOL 500 MG/1
TABLET, FILM COATED ORAL
COMMUNITY
Start: 2024-09-16

## 2024-09-20 RX ORDER — MIRTAZAPINE 15 MG/1
7.5 TABLET, FILM COATED ORAL AT BEDTIME
COMMUNITY

## 2024-09-20 ASSESSMENT — ENCOUNTER SYMPTOMS
SORE THROAT: 0
APPETITE CHANGE: 0
SHORTNESS OF BREATH: 0
DIARRHEA: 0
COUGH: 1
FEVER: 0
HEADACHES: 0
NAUSEA: 0
MYALGIAS: 0
RHINORRHEA: 1
FATIGUE: 1
VOMITING: 0

## 2024-09-20 NOTE — PROGRESS NOTES
SUBJECTIVE:   Violeta Maria is a 69 year old female presenting with a chief complaint of   Chief Complaint   Patient presents with    Urgent Care    Sinus Problem     Per patient states she has been having sinus pressure/pain, post nasal drip, cough-production and green mucus        She is an established patient of [facility].  Patient presents with nasal congestion and productive cough x 4 days.  No HEENT surgeries.  Patient has an Igg deficiency.  Patient takes azithromax 3 times a week - MWF.      Treatment:  albuterol and nettipot,     Review of Systems   Constitutional:  Positive for fatigue. Negative for appetite change and fever.   HENT:  Positive for congestion and rhinorrhea. Negative for ear pain and sore throat.    Respiratory:  Positive for cough. Negative for shortness of breath.    Cardiovascular:  Negative for chest pain.   Gastrointestinal:  Negative for diarrhea, nausea and vomiting.   Musculoskeletal:  Negative for myalgias.   Skin:  Negative for rash.   Neurological:  Negative for headaches.   All other systems reviewed and are negative.      Past Medical History:   Diagnosis Date    Acquired hypothyroidism     Allergic rhinitis     Anemia     Arthritis     Bronchitis     Chronic constipation     Chronic pain syndrome     Clonus     foot    COPD (chronic obstructive pulmonary disease) (H)     chronic cough    Depression with anxiety     DJD (degenerative joint disease)     Dysphagia     Fibromyalgia     GERD (gastroesophageal reflux disease)     Glaucoma     Heart disease     PVCs     IBS (irritable bowel syndrome)     IgG deficiency (H)     Irregular heart beat     PVCs    Lymphedema     Memory loss     MMT (medial meniscus tear)     RIGHT    Morbid obesity (H)     DANIELITO on CPAP     Osteopenia     Other chronic pain     Pain medication agreement     Polyarthritis     Primary insomnia     Psoriasis     PVD (peripheral vascular disease) (H24)     Recurrent pancreatitis     Uncomplicated asthma      Mild/moderate      No family history on file.  Current Outpatient Medications   Medication Sig Dispense Refill    albuterol (PROAIR HFA/PROVENTIL HFA/VENTOLIN HFA) 108 (90 Base) MCG/ACT inhaler Inhale 2 puffs into the lungs every 4 hours as needed for shortness of breath / dyspnea or wheezing      apixaban ANTICOAGULANT (ELIQUIS) 5 MG tablet Take 5 mg by mouth.      Ascorbic Acid 125 MG CHEW Take 1 tablet by mouth.      azelastine (ASTELIN) 0.1 % nasal spray Spray 1 spray into both nostrils 2 times daily.      azithromycin (ZITHROMAX) 250 MG tablet Take 250 mg by mouth three times a week.      benzonatate (TESSALON) 200 MG capsule Take 200 mg by mouth 3 times daily as needed for cough.      bimatoprost (LUMIGAN) 0.01 % SOLN Place 1 drop into both eyes at bedtime.      bumetanide (BUMEX) 1 MG tablet Take 1 mg by mouth daily as needed for edema.      CALCIUM PO Take 1,200-1,500 mg by mouth 3 times daily.      Carboxymethylcellul-Glycerin (REFRESH OPTIVE) 1-0.9 % GEL Apply to eye 3 times daily as needed.      carvedilol (COREG) 3.125 MG tablet Take 3.125 mg by mouth.      cetirizine (ZYRTEC) 10 MG tablet Take 10 mg by mouth daily.      clotrimazole (LOTRIMIN) 1 % external cream Apply topically daily as needed.      Coenzyme Q10 (COQ-10 PO) Take 1 tablet by mouth daily.      Cyanocobalamin (B-12 SL) Place 5,000 mcg under the tongue daily.      cycloSPORINE (RESTASIS) 0.05 % ophthalmic emulsion Place 1 drop into both eyes 4 times daily as needed.      diltiazem ER (DILT-XR) 180 MG 24 hr capsule Take 1 capsule by mouth daily.      diltiazem ER COATED BEADS (CARDIZEM CD/CARTIA XT) 120 MG 24 hr capsule Take 120 mg by mouth daily.      DOCUSATE CALCIUM PO Take 300 mg by mouth at bedtime. (3 x 100 mg = 300 mg dose)      doxycycline hyclate (VIBRA-TABS) 100 MG tablet Take 1 tablet (100 mg) by mouth 2 times daily for 7 days. 14 tablet 0    DULoxetine (CYMBALTA) 60 MG capsule Take 60 mg by mouth 2 times daily.      famotidine  (PEPCID) 20 MG tablet Take 20 mg by mouth.      ferrous fumarate 65 mg, [place]. FE,-Vitamin C 125 mg (VITRON C)  MG TABS tablet Take 1 tablet by mouth daily.      ferrous sulfate (FE TABS) 325 (65 Fe) MG EC tablet Take 325 mg by mouth.      Ferrous Sulfate (IRON PO) Take 1 tablet by mouth.      flecainide (TAMBOCOR) 50 MG tablet Take 50 mg by mouth every 12 hours.      fluticasone-salmeterol (ADVAIR) 250-50 MCG/DOSE diskus inhaler Inhale 1 puff into the lungs 2 times daily.      folic acid (FOLVITE) 1 MG tablet Take 1 mg by mouth.      glucosamine-chondroitinoitin 6850-1271 MG/30ML LIQD Take 15 mLs by mouth daily.      hydrocortisone (ANUSOL-HC) 2.5 % cream Place rectally 2 times daily as needed.      ipratropium - albuterol 0.5 mg/2.5 mg/3 mL (DUONEB) 0.5-2.5 (3) MG/3ML neb solution Take 1 vial (3 mLs) by nebulization every 6 hours as needed for shortness of breath or wheezing 30 mL 1    Lactobacillus (ACIDOPHILUS) CAPS Take 1 capsule by mouth daily.      lamoTRIgine (LAMICTAL) 150 MG tablet Take 300 mg by mouth daily. (Takes 2 x 150mg tablet = 300mg dose)      leflunomide (ARAVA) 20 MG tablet Take 20 mg by mouth daily.      levothyroxine (SYNTHROID) 125 MCG tablet Take 125 mcg by mouth daily.      medical cannabis (Patient's own supply) Inhale 1 Dose into the lungs daily as needed. THC/CBD Vaporizer  Filled by [employer]    (The purpose of this order is to document that the patient reports taking medical cannabis.  This is not a prescription, and is not used to certify that the patient has a qualifying medical condition.)      medical cannabis (Patient's own supply) by Other route daily as needed. Topical Cannabis Bar  Filled by [employer]    (The purpose of this order is to document that the patient reports taking medical cannabis.  This is not a prescription, and is not used to certify that the patient has a qualifying medical condition.)      medical cannabis (Patient's own supply) Take 1 Dose by  mouth daily as needed. Oral Capsules - Yellow  Filled by [facility] [employer]    (The purpose of this order is to document that the patient reports taking medical cannabis.  This is not a prescription, and is not used to certify that the patient has a qualifying medical condition.)      medical cannabis (Patient's own supply) Take 1 Dose by mouth daily as needed. Oral Capsules - Red  Filled by [facility] [employer]    (The purpose of this order is to document that the patient reports taking medical cannabis.  This is not a prescription, and is not used to certify that the patient has a qualifying medical condition.)      methocarbamol (ROBAXIN) 500 MG tablet TAKE 1 TABLET BY MOUTH FOUR TIMES A DAY AS NEEDED FOR PAIN      metoclopramide (REGLAN) 10 MG tablet Take 10 mg by mouth every 6 hours as needed.      mirtazapine (REMERON) 15 MG tablet Take 7.5 mg by mouth at bedtime.      montelukast (SINGULAIR) 10 MG tablet Take 10 mg by mouth daily.      mupirocin (BACTROBAN) 2 % ointment Apply topically 3 times daily as needed.      Nitroglycerin (NITROSTAT SL) Place 0.4 mg under the tongue every 5 minutes as needed for other (sphincter of oddi spasms).      nystatin (NYSTOP) 659204 UNIT/GM POWD Apply topically 2 times daily as needed.      nystatin-triamcinolone (MYCOLOG II) cream Apply topically 2 times daily as needed.      Omega-3 Fatty Acids (FISH OIL PO) Take 2 capsules by mouth 4 times daily.      Pediatric Multivitamins-Iron (FLINTSTONES PLUS IRON PO) Take 2 tablets by mouth daily.      Polyethylene Glycol 3350 (MIRALAX PO) Take 17 g by mouth daily as needed.      Potassium Chloride Ailyn ER (K-DUR PO) Take 10 mEq by mouth daily as needed. IF TAKING BUMEX      pregabalin (LYRICA) 150 MG capsule Take 100 mg by mouth 3 times daily.      sertraline (ZOLOFT) 50 MG tablet Take 75 mg by mouth daily.      spacer (OPTICHAMBER DEBBIE) holding chamber FOR HOME USE.      VITAMIN D, CHOLECALCIFEROL, PO Take 5,000 Units by mouth daily.       acetaminophen (TYLENOL) 500 MG tablet Take 500-1,000 mg by mouth every 6 hours as needed for mild pain (Patient not taking: Reported on 9/20/2024)      albuterol (2.5 MG/3ML) 0.083% neb solution Take 2.5 mg by nebulization every 6 hours as needed for shortness of breath / dyspnea or wheezing (Patient not taking: Reported on 7/16/2023)      ALPRAZolam (XANAX) 0.5 MG tablet Take 0.25-0.5 mg by mouth daily as needed for anxiety  (Patient not taking: Reported on 7/16/2023)      Baclofen (LIORESAL) 5 MG tablet Take 1-2 tablets by mouth 3 times daily as needed (Patient not taking: Reported on 9/20/2024)      celecoxib (CELEBREX) 100 MG capsule Take 100 mg by mouth 2 times daily (Patient not taking: Reported on 9/20/2024)      diazepam (VALIUM) 5 MG tablet Take 5-7.5 mg by mouth every 6 hours as needed for anxiety (Patient not taking: Reported on 7/16/2023)      doxycycline hyclate (VIBRAMYCIN) 100 MG capsule Take 1 capsule (100 mg) by mouth 2 times daily (Patient not taking: Reported on 9/20/2024) 14 capsule 0    estradiol (ESTRACE) 0.1 MG/GM cream Place 2 g vaginally daily as needed  (Patient not taking: Reported on 7/16/2023)      eszopiclone (LUNESTA) 1 MG TABS Take 0.5-1 mg by mouth At Bedtime  (Patient not taking: Reported on 7/16/2023)      fluconazole (DIFLUCAN) 200 MG tablet Take 200 mg by mouth daily as needed (When taking an antibiotic to prevent Thrush)  (Patient not taking: Reported on 9/20/2024)      fluorometholone (FML LIQUIFILM) 0.1 % ophthalmic susp Place 1 drop into both eyes every other day  (Patient not taking: Reported on 9/20/2024)      fluticasone (FLONASE) 50 MCG/ACT nasal spray Spray 1 spray into both nostrils 2 times daily  (Patient not taking: Reported on 7/16/2023)      guaiFENesin-dextromethorphan (ROBITUSSIN DM) 100-10 MG/5ML syrup Take 10 mLs by mouth 2 times daily as needed for cough (Patient not taking: Reported on 9/20/2024)      hydrocortisone, Perianal, (ANUSOL-HC) 2.5 % cream Place 1  Application rectally 3 times daily (Patient not taking: Reported on 9/20/2024)      hydrOXYzine (VISTARIL) 25 MG capsule Take 1 capsule (25 mg) by mouth 3 times daily as needed for itching (spasm) (Patient not taking: Reported on 7/16/2023) 30 capsule 1    ketoconazole (NIZORAL) 2 % cream Apply topically daily as needed for itching  (Patient not taking: Reported on 9/20/2024)      lidocaine (ASPERCREME W/LIDOCAINE) 4 % CREA cream Apply topically 3 times daily as needed for mild pain (Patient not taking: Reported on 9/20/2024)      MAGNESIUM PO Take 1 tablet by mouth daily (Patient not taking: Reported on 9/20/2024)      Melatonin 10 MG TABS tablet Take 0.5 tablets by mouth At Bedtime (Patient not taking: Reported on 9/20/2024)      meloxicam (MOBIC) 7.5 MG tablet Take 7.5 mg by mouth daily as needed (Patient not taking: Reported on 7/16/2023)      metaxalone (SKELAXIN) 800 MG tablet Take 800 mg by mouth 3 times daily  (Patient not taking: Reported on 7/16/2023)      pantoprazole (PROTONIX) 40 MG EC tablet Take 40 mg by mouth daily (Patient not taking: Reported on 9/20/2024)      predniSONE (DELTASONE) 5 MG tablet Take 1 tablet by mouth daily at 2 pm (Patient not taking: Reported on 7/16/2023)      ranitidine (ZANTAC) 150 MG tablet Take 150 mg by mouth 2 times daily  (Patient not taking: Reported on 7/16/2023)      traMADol (ULTRAM) 50 MG tablet Take 100 mg by mouth daily (Patient not taking: Reported on 7/16/2023)      traZODone (DESYREL) 50 MG tablet Take  mg by mouth At Bedtime  (Patient not taking: Reported on 7/16/2023)       Social History     Tobacco Use    Smoking status: Never    Smokeless tobacco: Never   Substance Use Topics    Alcohol use: No       OBJECTIVE  BP (!) 149/69   Pulse 62   Temp 97.9  F (36.6  C) (Tympanic)   Resp 18   Wt 61.7 kg (136 lb)   LMP 04/17/2010   SpO2 98%   BMI 23.34 kg/m      Physical Exam  Vitals and nursing note reviewed.   Constitutional:       General: She is not  in acute distress.     Appearance: Normal appearance. She is normal weight. She is not ill-appearing.   HENT:      Head: Normocephalic and atraumatic.      Right Ear: Tympanic membrane, ear canal and external ear normal.      Left Ear: Tympanic membrane, ear canal and external ear normal.      Nose: Nose normal.      Mouth/Throat:      Mouth: Mucous membranes are moist.      Pharynx: Oropharynx is clear.      Comments: PND  Eyes:      Extraocular Movements: Extraocular movements intact.      Conjunctiva/sclera: Conjunctivae normal.   Cardiovascular:      Rate and Rhythm: Normal rate and regular rhythm.      Pulses: Normal pulses.      Heart sounds: Normal heart sounds.   Pulmonary:      Effort: Pulmonary effort is normal.      Breath sounds: Normal breath sounds.   Musculoskeletal:      Cervical back: Normal range of motion.   Skin:     General: Skin is warm and dry.      Findings: No rash.   Neurological:      General: No focal deficit present.      Mental Status: She is alert.   Psychiatric:         Mood and Affect: Mood normal.         Behavior: Behavior normal.         Labs:  No results found for this or any previous visit (from the past 24 hour(s)).    ASSESSMENT:      ICD-10-CM    1. Acute cough  R05.1 CANCELED: XR Chest 2 Views      2. Acute non-recurrent sinusitis, unspecified location  J01.90 doxycycline hyclate (VIBRA-TABS) 100 MG tablet      3. Immunosuppression (H24)  D84.9            Medical Decision Making:    Differential Diagnosis:  URI Adult/Peds:  Bronchitis-viral and Sinusitis    Serious Comorbid Conditions:  Adult:   reviewed    PLAN:    Rx for doxcycycline.  Do not take azithromax while on it.  Continue other medications.  Discussed reasons to seek immediate medical attention.  Additionally if no improvement or worsening in one week, may follow up with PCP and/or UC.        Followup:    If not improving or if condition worsens, follow up with your Primary Care Provider, If not improving or if  conditions worsens over the next 12-24 hours, go to the Emergency Department    There are no Patient Instructions on file for this visit.

## (undated) DEVICE — DRSG STERI STRIP 1/2X4" R1547

## (undated) DEVICE — SYR 07ML EPIDURAL LOSS OF RESISTANCE PULSATOR 4905

## (undated) DEVICE — DRSG TEGADERM 8X12" 1629

## (undated) DEVICE — GLOVE PROTEXIS BLUE W/NEU-THERA 8.0  2D73EB80

## (undated) DEVICE — GLOVE PROTEXIS BLUE W/NEU-THERA 6.0  2D73EB60

## (undated) DEVICE — SU SILK 2-0 SH CR 8X18" C012D

## (undated) DEVICE — TUNNELING TOOL AND OBTURATOR

## (undated) DEVICE — POSITIONER PT PRONESAFE HEAD REST W/DERMAPROX INSERT 40599

## (undated) DEVICE — PACK UNIVERSAL SPLIT 29131

## (undated) DEVICE — PACK MINOR SBA15MIFSE

## (undated) DEVICE — GLOVE PROTEXIS MICRO 6.0  2D73PM60

## (undated) DEVICE — Device

## (undated) DEVICE — SUCTION CANISTER MEDIVAC LINER 3000ML W/LID 65651-530

## (undated) DEVICE — NDL 22GA 1.5"

## (undated) DEVICE — SU VICRYL 4-0 PS-2 18" UND J496H

## (undated) DEVICE — DRAPE STERI TOWEL LG 1010

## (undated) DEVICE — SU MONOCRYL 4-0 PS-2 18" UND Y496G

## (undated) DEVICE — SOL WATER IRRIG 1000ML BOTTLE 2F7114

## (undated) DEVICE — PREP DURAPREP 26ML APL 8630

## (undated) DEVICE — DRAPE IOBAN INCISE 23X17" 6650EZ

## (undated) DEVICE — DRAPE COVER C-ARM SEAMLESS SNAP-KAP 03-KP26 LATEX FREE

## (undated) DEVICE — SYR EAR BULB 3OZ 0035830

## (undated) DEVICE — ADH LIQUID MASTISOL TOPICAL VIAL 2-3ML 0523-48

## (undated) DEVICE — DRSG KERLIX FLUFFS X5

## (undated) DEVICE — DRAPE SHEET REV FOLD 3/4 9349

## (undated) DEVICE — SU VICRYL 0 CT-2 CR 8X18" J727D

## (undated) DEVICE — DRSG TELFA ISLAND 4X8" 7541

## (undated) DEVICE — PATIENT REMOTE KIT

## (undated) DEVICE — PERSONAL THERAPY MANAGER

## (undated) DEVICE — DRSG GAUZE 4X4" 3033

## (undated) DEVICE — CHARGER KIT

## (undated) DEVICE — SPONGE RAY-TEC 4X8" 7318

## (undated) DEVICE — DRAPE LEGGINGS 8421

## (undated) DEVICE — NDL 19GA 1.5"

## (undated) DEVICE — SYR 03ML LL W/O NDL 309657

## (undated) DEVICE — ESU PENCIL W/SMOKE EVAC CVPLP2000

## (undated) DEVICE — LINEN TOWEL PACK X5 5464

## (undated) DEVICE — GLOVE PROTEXIS POWDER FREE 7.0 ORTHOPEDIC 2D73ET70

## (undated) RX ORDER — CLINDAMYCIN PHOSPHATE 900 MG/50ML
INJECTION, SOLUTION INTRAVENOUS
Status: DISPENSED
Start: 2018-10-18

## (undated) RX ORDER — OXYCODONE HYDROCHLORIDE 5 MG/1
TABLET ORAL
Status: DISPENSED
Start: 2018-10-18

## (undated) RX ORDER — PROPOFOL 10 MG/ML
INJECTION, EMULSION INTRAVENOUS
Status: DISPENSED
Start: 2018-10-18

## (undated) RX ORDER — HYDROXYZINE HYDROCHLORIDE 25 MG/1
TABLET, FILM COATED ORAL
Status: DISPENSED
Start: 2018-10-18

## (undated) RX ORDER — PROPOFOL 10 MG/ML
INJECTION, EMULSION INTRAVENOUS
Status: DISPENSED
Start: 2020-03-05

## (undated) RX ORDER — GLYCOPYRROLATE 0.2 MG/ML
INJECTION, SOLUTION INTRAMUSCULAR; INTRAVENOUS
Status: DISPENSED
Start: 2018-10-18

## (undated) RX ORDER — ACETAMINOPHEN 325 MG/1
TABLET ORAL
Status: DISPENSED
Start: 2018-10-18

## (undated) RX ORDER — OXYCODONE HCL 10 MG/1
TABLET, FILM COATED, EXTENDED RELEASE ORAL
Status: DISPENSED
Start: 2018-10-18

## (undated) RX ORDER — FENTANYL CITRATE 50 UG/ML
INJECTION, SOLUTION INTRAMUSCULAR; INTRAVENOUS
Status: DISPENSED
Start: 2018-10-18

## (undated) RX ORDER — FENTANYL CITRATE 0.05 MG/ML
INJECTION, SOLUTION INTRAMUSCULAR; INTRAVENOUS
Status: DISPENSED
Start: 2020-03-05

## (undated) RX ORDER — LIDOCAINE HYDROCHLORIDE 20 MG/ML
INJECTION, SOLUTION EPIDURAL; INFILTRATION; INTRACAUDAL; PERINEURAL
Status: DISPENSED
Start: 2020-03-05

## (undated) RX ORDER — KETAMINE HYDROCHLORIDE 10 MG/ML
INJECTION INTRAMUSCULAR; INTRAVENOUS
Status: DISPENSED
Start: 2018-10-18

## (undated) RX ORDER — DEXAMETHASONE SODIUM PHOSPHATE 4 MG/ML
INJECTION, SOLUTION INTRA-ARTICULAR; INTRALESIONAL; INTRAMUSCULAR; INTRAVENOUS; SOFT TISSUE
Status: DISPENSED
Start: 2020-03-05

## (undated) RX ORDER — OXYCODONE HYDROCHLORIDE 5 MG/1
TABLET ORAL
Status: DISPENSED
Start: 2020-03-05

## (undated) RX ORDER — ONDANSETRON 2 MG/ML
INJECTION INTRAMUSCULAR; INTRAVENOUS
Status: DISPENSED
Start: 2020-03-05

## (undated) RX ORDER — HYDROMORPHONE HYDROCHLORIDE 1 MG/ML
INJECTION, SOLUTION INTRAMUSCULAR; INTRAVENOUS; SUBCUTANEOUS
Status: DISPENSED
Start: 2018-10-18

## (undated) RX ORDER — FENTANYL CITRATE 50 UG/ML
INJECTION, SOLUTION INTRAMUSCULAR; INTRAVENOUS
Status: DISPENSED
Start: 2020-03-05

## (undated) RX ORDER — CLINDAMYCIN PHOSPHATE 900 MG/50ML
INJECTION, SOLUTION INTRAVENOUS
Status: DISPENSED
Start: 2020-03-05